# Patient Record
Sex: MALE | Race: WHITE | NOT HISPANIC OR LATINO | Employment: FULL TIME | URBAN - METROPOLITAN AREA
[De-identification: names, ages, dates, MRNs, and addresses within clinical notes are randomized per-mention and may not be internally consistent; named-entity substitution may affect disease eponyms.]

---

## 2019-02-01 ENCOUNTER — TRANSCRIBE ORDERS (OUTPATIENT)
Dept: ADMINISTRATIVE | Facility: HOSPITAL | Age: 54
End: 2019-02-01

## 2019-02-01 DIAGNOSIS — L72.0 EPIDERMOID CYST: Primary | ICD-10-CM

## 2019-02-08 ENCOUNTER — HOSPITAL ENCOUNTER (OUTPATIENT)
Dept: ULTRASOUND IMAGING | Facility: HOSPITAL | Age: 54
Discharge: HOME | End: 2019-02-08

## 2019-02-08 ENCOUNTER — HOSPITAL ENCOUNTER (OUTPATIENT)
Dept: ULTRASOUND IMAGING | Facility: HOSPITAL | Age: 54
Discharge: HOME | End: 2019-02-08
Attending: FAMILY MEDICINE | Admitting: FAMILY MEDICINE

## 2019-02-08 DIAGNOSIS — L72.0 EPIDERMOID CYST: ICD-10-CM

## 2019-02-08 PROCEDURE — 93976 VASCULAR STUDY: CPT

## 2019-02-08 PROCEDURE — 76870 US EXAM SCROTUM: CPT

## 2019-08-09 ENCOUNTER — TELEPHONE (OUTPATIENT)
Dept: GASTROENTEROLOGY | Facility: CLINIC | Age: 54
End: 2019-08-09

## 2019-08-09 NOTE — TELEPHONE ENCOUNTER
PATIENT CALLING TO CHECK ON HIS PAPERS HE MAILED COUPLE WEEKS AGO.  EXPLAINED UNABLE TO LOCATE FAST TRACK OR PHONE MESSAGE.  DID FAST TRACK OVER THE PHONE.    FAST TRACK (IN MEDIA) - LAST COLON 08/2011 - 5 YEAR RECALL - PERSONAL HISTORY OF POLYPS - FAMILY HX POLYPS, MOTHER AND FAMILY HX CRC GRAND FATHER (MOTHER SIDE) - SCHEDULE AT [place].    [facility] ARRIVE 1:45PM.  WILL MAIL INSTRUCTIONS.

## 2019-08-12 ENCOUNTER — PREP FOR SURGERY (OUTPATIENT)
Dept: OTHER | Facility: HOSPITAL | Age: 54
End: 2019-08-12

## 2019-08-12 DIAGNOSIS — Z86.010 PERSONAL HISTORY OF COLONIC POLYPS: Primary | ICD-10-CM

## 2019-08-13 PROBLEM — Z86.010 PERSONAL HISTORY OF COLONIC POLYPS: Status: ACTIVE | Noted: 2019-08-13

## 2019-08-13 PROBLEM — Z86.0100 PERSONAL HISTORY OF COLONIC POLYPS: Status: ACTIVE | Noted: 2019-08-13

## 2019-08-30 ENCOUNTER — OFFICE VISIT (OUTPATIENT)
Dept: ORTHOPEDIC SURGERY | Facility: CLINIC | Age: 54
End: 2019-08-30

## 2019-08-30 VITALS
SYSTOLIC BLOOD PRESSURE: 131 MMHG | WEIGHT: 205 LBS | HEART RATE: 60 BPM | DIASTOLIC BLOOD PRESSURE: 84 MMHG | HEIGHT: 73 IN | BODY MASS INDEX: 27.17 KG/M2

## 2019-08-30 DIAGNOSIS — R52 PAIN: Primary | ICD-10-CM

## 2019-08-30 DIAGNOSIS — M75.41 SUBACROMIAL IMPINGEMENT OF RIGHT SHOULDER: ICD-10-CM

## 2019-08-30 DIAGNOSIS — M75.51 SUBACROMIAL BURSITIS OF RIGHT SHOULDER JOINT: ICD-10-CM

## 2019-08-30 PROCEDURE — 73030 X-RAY EXAM OF SHOULDER: CPT | Performed by: NURSE PRACTITIONER

## 2019-08-30 PROCEDURE — 20610 DRAIN/INJ JOINT/BURSA W/O US: CPT | Performed by: NURSE PRACTITIONER

## 2019-08-30 PROCEDURE — 99203 OFFICE O/P NEW LOW 30 MIN: CPT | Performed by: NURSE PRACTITIONER

## 2019-08-30 RX ORDER — ERGOCALCIFEROL 1.25 MG/1
CAPSULE ORAL
COMMUNITY
Start: 2019-05-28

## 2019-08-30 RX ORDER — LIDOCAINE HYDROCHLORIDE 10 MG/ML
4 INJECTION, SOLUTION EPIDURAL; INFILTRATION; INTRACAUDAL; PERINEURAL
Status: COMPLETED | OUTPATIENT
Start: 2019-08-30 | End: 2019-08-30

## 2019-08-30 RX ORDER — FAMOTIDINE 40 MG/1
TABLET, FILM COATED ORAL
COMMUNITY
Start: 2019-08-18

## 2019-08-30 RX ORDER — FENOFIBRATE 134 MG/1
CAPSULE ORAL
COMMUNITY
Start: 2019-08-28 | End: 2020-01-10

## 2019-08-30 RX ORDER — MELOXICAM 15 MG/1
15 TABLET ORAL DAILY
Qty: 30 TABLET | Refills: 0 | Status: SHIPPED | OUTPATIENT
Start: 2019-08-30 | End: 2020-01-10

## 2019-08-30 RX ORDER — TRIAMCINOLONE ACETONIDE 40 MG/ML
80 INJECTION, SUSPENSION INTRA-ARTICULAR; INTRAMUSCULAR
Status: COMPLETED | OUTPATIENT
Start: 2019-08-30 | End: 2019-08-30

## 2019-08-30 RX ADMIN — TRIAMCINOLONE ACETONIDE 80 MG: 40 INJECTION, SUSPENSION INTRA-ARTICULAR; INTRAMUSCULAR at 09:34

## 2019-08-30 RX ADMIN — LIDOCAINE HYDROCHLORIDE 4 ML: 10 INJECTION, SOLUTION EPIDURAL; INFILTRATION; INTRACAUDAL; PERINEURAL at 09:34

## 2019-08-30 NOTE — PROGRESS NOTES
Procedure   Large Joint Arthrocentesis: R subacromial bursa  Date/Time: 8/30/2019 9:34 AM  Consent given by: patient  Site marked: site marked  Timeout: Immediately prior to procedure a time out was called to verify the correct patient, procedure, equipment, support staff and site/side marked as required   Supporting Documentation  Indications: pain   Procedure Details  Location: shoulder - R subacromial bursa  Preparation: Patient was prepped and draped in the usual sterile fashion  Needle size: 22 G  Approach: lateral  Medications administered: 4 mL lidocaine PF 1% 1 %; 80 mg triamcinolone acetonide 40 MG/ML  Patient tolerance: patient tolerated the procedure well with no immediate complications

## 2019-08-30 NOTE — PROGRESS NOTES
Subjective:     Patient ID: Clifford Zheng is a 54 y.o. male.    Chief Complaint:  Right shoulder injury  History of Present Illness  Clifford Zheng clinic with several week history of pain at the right shoulder.  Injury occurred 6/24/2019 after he was at home fell down steps.  He had abdominal released with the left upper extremity and his cell phone in the hand of the right upper extremity when he fell down approximately 5 steps walking outside and is unsure how he landed or hit the right shoulder.  He has been experiencing pain ever since.  He is been taking ibuprofen which does help relieve some discomfort however continues to experience maximal tenderness present at the lateral aspect of the shoulder.  He is a side sleeper and is having significant difficulty at night trying to rest secondary to the pain he is experiencing when he attempts to lay on the right side.  He does have to get off the right side secondary to pain.  Rates discomfort at worst and 7 to an 8 out of a 10 aching throbbing in nature.  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  With the arms out straight and he is gripping he is experiencing pain at the lateral aspect of the shoulder.  Denies pain radiating into the neck.  Denies presence of numbness or tingling radiating down the right upper extremity.  Denies previous x-ray, MRI, CT of the shoulder in the past.  He has had pain at the left shoulder which was work related however this is not.  He is right-hand dominant.  Denies other concerns that he has at this time.       Social History     Occupational History   • Not on file   Tobacco Use   • Smoking status: Never Smoker   Substance and Sexual Activity   • Alcohol use: No     Frequency: Never   • Drug use: Not on file   • Sexual activity: Not on file      Past Medical History:   Diagnosis Date   • Sleep apnea      No past surgical history  "on file.    Family History   Problem Relation Age of Onset   • Heart disease Mother    • Lung disease Maternal Grandmother    • Cancer Maternal Grandfather          Review of Systems   Constitutional: Negative for chills, diaphoresis, fever and unexpected weight change.   HENT: Negative for hearing loss, nosebleeds, sore throat and tinnitus.    Eyes: Negative for pain and visual disturbance.   Respiratory: Negative for cough, shortness of breath and wheezing.    Cardiovascular: Negative for chest pain and palpitations.   Gastrointestinal: Negative for abdominal pain, diarrhea, nausea and vomiting.   Endocrine: Negative for cold intolerance, heat intolerance and polydipsia.   Genitourinary: Negative for difficulty urinating, dysuria and hematuria.   Musculoskeletal: Positive for arthralgias.   Skin: Negative for rash and wound.   Allergic/Immunologic: Negative for environmental allergies.   Neurological: Negative for dizziness, syncope and numbness.   Hematological: Does not bruise/bleed easily.   Psychiatric/Behavioral: Negative for dysphoric mood and sleep disturbance. The patient is not nervous/anxious.    All other systems reviewed and are negative.          Objective:  Physical Exam    Vital signs reviewed.   General: No acute distress.  Eyes: conjunctiva clear; pupils equally round and reactive  ENT: external ears and nose atraumatic; oropharynx clear  CV: no peripheral edema  Resp: normal respiratory effort  Skin: no rashes or wounds; normal turgor  Psych: mood and affect appropriate; recent and remote memory intact    Vitals:    08/30/19 0833   BP: 131/84   Pulse: 60   Weight: 93 kg (205 lb)   Height: 185.4 cm (73\")         08/30/19  0833   Weight: 93 kg (205 lb)     Body mass index is 27.05 kg/m².      Right Shoulder Exam     Tenderness   The patient is experiencing tenderness in the acromion.    Range of Motion   External rotation: 60   Forward flexion: 180   Internal rotation 0 degrees: T10     Muscle " Strength   Internal rotation: 5/5   External rotation: 5/5   Supraspinatus: 5/5   Subscapularis: 5/5   Biceps: 5/5     Tests   Kendrick test: positive  Cross arm: negative  Impingement: positive  Drop arm: negative  Sulcus: absent    Other   Erythema: absent  Scars: absent  Sensation: normal  Pulse: present    Comments:  Negative empty can  negative Dane's  negative Speed's  negative bear hug exam  Negative lift-off               Imaging:  Right Shoulder X-Ray  Indication: Pain  AP Internal and External Rotation views    Findings:  No fracture  No bony lesion  Normal soft tissues  AC joint arthropathy     No prior studies were available for comparison.    Assessment:        1. Pain    2. Subacromial bursitis of right shoulder joint    3. Subacromial impingement of right shoulder           Plan:  1. Discussed plan of care with patient. Wishes to proceed with corticosteroid injection Subacromial bursa, lateral approach.  Discussed risks of injection including but not limited to tendon tear however wishes to proceed with injection.  2.  We will start him on meloxicam 15 mg 1 tablet once daily.  We will plan to see him back in clinic in approximately 4 weeks to reevaluate.  Patient verbalized understanding of all information agrees with plan of care.  He was also provided with home strengthening exercises for him to complete the right upper extremity.  Denies other concerns that he has at this time.  Orders:  Orders Placed This Encounter   Procedures   • XR Shoulder 2+ View Right       I ordered and reviewed the LEANNE today.     Dictated utilizing Dragon dictation

## 2019-10-24 ENCOUNTER — ANESTHESIA EVENT (OUTPATIENT)
Dept: PERIOP | Facility: HOSPITAL | Age: 54
End: 2019-10-24

## 2019-10-25 ENCOUNTER — HOSPITAL ENCOUNTER (OUTPATIENT)
Facility: HOSPITAL | Age: 54
Setting detail: HOSPITAL OUTPATIENT SURGERY
Discharge: HOME | End: 2019-10-25
Attending: INTERNAL MEDICINE | Admitting: INTERNAL MEDICINE

## 2019-10-25 ENCOUNTER — ANESTHESIA (OUTPATIENT)
Dept: PERIOP | Facility: HOSPITAL | Age: 54
End: 2019-10-25

## 2019-10-25 VITALS
WEIGHT: 199 LBS | OXYGEN SATURATION: 97 % | DIASTOLIC BLOOD PRESSURE: 82 MMHG | RESPIRATION RATE: 12 BRPM | TEMPERATURE: 98.2 F | HEIGHT: 73 IN | HEART RATE: 70 BPM | SYSTOLIC BLOOD PRESSURE: 113 MMHG | BODY MASS INDEX: 26.37 KG/M2

## 2019-10-25 DIAGNOSIS — Z86.010 PERSONAL HISTORY OF COLONIC POLYPS: ICD-10-CM

## 2019-10-25 PROCEDURE — 45380 COLONOSCOPY AND BIOPSY: CPT | Performed by: INTERNAL MEDICINE

## 2019-10-25 PROCEDURE — 88305 TISSUE EXAM BY PATHOLOGIST: CPT | Performed by: INTERNAL MEDICINE

## 2019-10-25 PROCEDURE — 25010000002 PROPOFOL 10 MG/ML EMULSION: Performed by: NURSE ANESTHETIST, CERTIFIED REGISTERED

## 2019-10-25 RX ORDER — ONDANSETRON 2 MG/ML
4 INJECTION INTRAMUSCULAR; INTRAVENOUS ONCE AS NEEDED
Status: CANCELLED | OUTPATIENT
Start: 2019-10-25

## 2019-10-25 RX ORDER — PROPOFOL 10 MG/ML
VIAL (ML) INTRAVENOUS AS NEEDED
Status: DISCONTINUED | OUTPATIENT
Start: 2019-10-25 | End: 2019-10-25 | Stop reason: SURG

## 2019-10-25 RX ORDER — SODIUM CHLORIDE 0.9 % (FLUSH) 0.9 %
1-10 SYRINGE (ML) INJECTION AS NEEDED
Status: DISCONTINUED | OUTPATIENT
Start: 2019-10-25 | End: 2019-10-25 | Stop reason: HOSPADM

## 2019-10-25 RX ORDER — SODIUM CHLORIDE, SODIUM LACTATE, POTASSIUM CHLORIDE, CALCIUM CHLORIDE 600; 310; 30; 20 MG/100ML; MG/100ML; MG/100ML; MG/100ML
100 INJECTION, SOLUTION INTRAVENOUS CONTINUOUS
Status: CANCELLED | OUTPATIENT
Start: 2019-10-25

## 2019-10-25 RX ORDER — SODIUM CHLORIDE 0.9 % (FLUSH) 0.9 %
3 SYRINGE (ML) INJECTION EVERY 12 HOURS SCHEDULED
Status: DISCONTINUED | OUTPATIENT
Start: 2019-10-25 | End: 2019-10-25 | Stop reason: HOSPADM

## 2019-10-25 RX ORDER — SODIUM CHLORIDE, SODIUM LACTATE, POTASSIUM CHLORIDE, CALCIUM CHLORIDE 600; 310; 30; 20 MG/100ML; MG/100ML; MG/100ML; MG/100ML
9 INJECTION, SOLUTION INTRAVENOUS CONTINUOUS
Status: DISCONTINUED | OUTPATIENT
Start: 2019-10-25 | End: 2019-10-25 | Stop reason: HOSPADM

## 2019-10-25 RX ORDER — LIDOCAINE HYDROCHLORIDE 20 MG/ML
INJECTION, SOLUTION INFILTRATION; PERINEURAL AS NEEDED
Status: DISCONTINUED | OUTPATIENT
Start: 2019-10-25 | End: 2019-10-25 | Stop reason: SURG

## 2019-10-25 RX ORDER — LIDOCAINE HYDROCHLORIDE 10 MG/ML
0.5 INJECTION, SOLUTION EPIDURAL; INFILTRATION; INTRACAUDAL; PERINEURAL ONCE AS NEEDED
Status: COMPLETED | OUTPATIENT
Start: 2019-10-25 | End: 2019-10-25

## 2019-10-25 RX ORDER — SODIUM CHLORIDE 9 MG/ML
40 INJECTION, SOLUTION INTRAVENOUS AS NEEDED
Status: DISCONTINUED | OUTPATIENT
Start: 2019-10-25 | End: 2019-10-25 | Stop reason: HOSPADM

## 2019-10-25 RX ADMIN — LIDOCAINE HYDROCHLORIDE 100 MG: 20 INJECTION, SOLUTION INFILTRATION; PERINEURAL at 14:06

## 2019-10-25 RX ADMIN — PROPOFOL 50 MG: 10 INJECTION, EMULSION INTRAVENOUS at 14:17

## 2019-10-25 RX ADMIN — SODIUM CHLORIDE, POTASSIUM CHLORIDE, SODIUM LACTATE AND CALCIUM CHLORIDE 9 ML/HR: 600; 310; 30; 20 INJECTION, SOLUTION INTRAVENOUS at 14:00

## 2019-10-25 RX ADMIN — PROPOFOL 100 MG: 10 INJECTION, EMULSION INTRAVENOUS at 14:09

## 2019-10-25 RX ADMIN — LIDOCAINE HYDROCHLORIDE 0.1 ML: 10 INJECTION, SOLUTION EPIDURAL; INFILTRATION; INTRACAUDAL; PERINEURAL at 14:01

## 2019-10-25 RX ADMIN — PROPOFOL 50 MG: 10 INJECTION, EMULSION INTRAVENOUS at 14:23

## 2019-10-25 RX ADMIN — PROPOFOL 50 MG: 10 INJECTION, EMULSION INTRAVENOUS at 14:12

## 2019-10-25 NOTE — ANESTHESIA PREPROCEDURE EVALUATION
Anesthesia Evaluation     Patient summary reviewed and Nursing notes reviewed   no history of anesthetic complications:  NPO Solid Status: > 8 hours  NPO Liquid Status: > 8 hours           Airway   Mallampati: II  TM distance: >3 FB  Neck ROM: full  No difficulty expected  Dental - normal exam     Pulmonary - normal exam    breath sounds clear to auscultation  (+) sleep apnea on CPAP,   Cardiovascular - negative cardio ROS and normal exam    Rhythm: regular  Rate: normal        Neuro/Psych- negative ROS  GI/Hepatic/Renal/Endo    (+)  GERD well controlled,      Musculoskeletal     (+) back pain, neck pain,   Abdominal  - normal exam   Substance History - negative use     OB/GYN          Other   (+) arthritis                     Anesthesia Plan    ASA 2     MAC     intravenous induction   Anesthetic plan, all risks, benefits, and alternatives have been provided, discussed and informed consent has been obtained with: patient.  Use of blood products discussed with patient  Consented to blood products.

## 2019-10-25 NOTE — ANESTHESIA POSTPROCEDURE EVALUATION
Patient: Clifford Zheng    Procedure Summary     Date:  10/25/19 Room / Location:  [facility] ENDOSCOPY 1 /  [facility]    Anesthesia Start:  1404 Anesthesia Stop:  1429    Procedure:  COLONOSCOPY (N/A ) Diagnosis:       Personal history of colonic polyps      Colon polyp      Diverticulosis large intestine w/o perforation or abscess w/o bleeding      (Personal history of colonic polyps [Z86.010])    Surgeon:  Jimmy Gonzalez MD Provider:  Megan Hernandez CRNA    Anesthesia Type:  MAC ASA Status:  2          Anesthesia Type: MAC  Last vitals  BP   113/82 (10/25/19 1501)   Temp   98.2 °F (36.8 °C) (10/25/19 1342)   Pulse   70 (10/25/19 1501)   Resp   12 (10/25/19 1445)     SpO2   97 % (10/25/19 1501)     Post Anesthesia Care and Evaluation    Patient location during evaluation: bedside  Patient participation: complete - patient participated  Level of consciousness: awake and alert  Pain score: 0  Pain management: adequate  Airway patency: patent  Anesthetic complications: No anesthetic complications    Cardiovascular status: acceptable  Respiratory status: acceptable  Hydration status: acceptable

## 2019-10-28 LAB
CYTO UR: NORMAL
LAB AP CASE REPORT: NORMAL
PATH REPORT.FINAL DX SPEC: NORMAL
PATH REPORT.GROSS SPEC: NORMAL

## 2019-12-20 ENCOUNTER — TRANSCRIBE ORDERS (OUTPATIENT)
Dept: ADMINISTRATIVE | Facility: HOSPITAL | Age: 54
End: 2019-12-20

## 2019-12-20 DIAGNOSIS — N18.2 KIDNEY DISEASE, CHRONIC, STAGE II (MILD, EGFR 60+ ML/MIN): Primary | ICD-10-CM

## 2019-12-23 ENCOUNTER — HOSPITAL ENCOUNTER (OUTPATIENT)
Dept: ULTRASOUND IMAGING | Facility: HOSPITAL | Age: 54
Discharge: HOME | End: 2019-12-23
Admitting: FAMILY MEDICINE

## 2019-12-23 DIAGNOSIS — N18.2 KIDNEY DISEASE, CHRONIC, STAGE II (MILD, EGFR 60+ ML/MIN): ICD-10-CM

## 2019-12-23 PROCEDURE — 76775 US EXAM ABDO BACK WALL LIM: CPT

## 2020-01-10 ENCOUNTER — OFFICE VISIT (OUTPATIENT)
Dept: ORTHOPEDIC SURGERY | Facility: CLINIC | Age: 55
End: 2020-01-10

## 2020-01-10 DIAGNOSIS — M75.41 SUBACROMIAL IMPINGEMENT OF RIGHT SHOULDER: Primary | ICD-10-CM

## 2020-01-10 DIAGNOSIS — M67.911 TENDINOPATHY OF RIGHT ROTATOR CUFF: ICD-10-CM

## 2020-01-10 PROCEDURE — 99213 OFFICE O/P EST LOW 20 MIN: CPT | Performed by: NURSE PRACTITIONER

## 2020-01-10 NOTE — PROGRESS NOTES
Subjective:     Patient ID: Clifford Zheng is a 54 y.o. male.    Chief Complaint:  Follow-up right shoulder impingement syndrome, subacromial bursitis  History of Present Illness  Clifford Zheng returns to clinic today for evaluation of right shoulder. Was last seen in clinic in August 2019, received corticosteroid injection subacromial bursa, lateral approach without any symptom relief. Continues to experience pain lateral acromion, anterior and posterior aspect of shoulder. Increased pain with reaching out to side, reaching behind back, pain present when sleeping at night on lateral aspect of shoulder. Positive for decreased strength right upper extremity. He has been preoccupied with other acute issues therefore has not yet had the opportunity to return to clinic for follow-up however received no symptom relief with treatment. He was unable to tolerate oral NSAID secondary to cyst on kidneys, all NSAID medications were discontinued. Denies presence of numbness or tingling radiating down right upper extremity. He has had previous MRI left shoulder which did not require surgical intervention however denies previous MRI right shoulder. He has tried physical therapy in past again with left shoulder only which exacerbated symptoms but no formal physical therapy right shoulder. Pain is not radiating into neck. Denies all other concerns present at this time.      Social History     Occupational History   • Not on file   Tobacco Use   • Smoking status: Never Smoker   • Smokeless tobacco: Never Used   Substance and Sexual Activity   • Alcohol use: No     Frequency: Never   • Drug use: Not on file   • Sexual activity: Not on file      Past Medical History:   Diagnosis Date   • Colon polyp    • GERD (gastroesophageal reflux disease)    • Shoulder pain     right   • Sleep apnea      Past Surgical History:   Procedure Laterality Date   • COLONOSCOPY     • COLONOSCOPY N/A 10/25/2019    Procedure: COLONOSCOPY;  Surgeon: Lisa  Jimmy Banks MD;  Location: [facility];  Service: Gastroenterology       Family History   Problem Relation Age of Onset   • Heart disease Mother    • Lung disease Maternal Grandmother    • Cancer Maternal Grandfather          Review of Systems   Constitutional: Negative for chills, diaphoresis, fever and unexpected weight change.   HENT: Negative for hearing loss, nosebleeds, sore throat and tinnitus.    Eyes: Negative for pain and visual disturbance.   Respiratory: Negative for cough, shortness of breath and wheezing.    Cardiovascular: Negative for chest pain and palpitations.   Gastrointestinal: Negative for abdominal pain, diarrhea, nausea and vomiting.   Endocrine: Negative for cold intolerance, heat intolerance and polydipsia.   Genitourinary: Negative for difficulty urinating, dysuria and hematuria.   Musculoskeletal: Positive for arthralgias. Negative for joint swelling and myalgias.   Skin: Negative for rash and wound.   Allergic/Immunologic: Negative for environmental allergies.   Neurological: Negative for dizziness, syncope and numbness.   Hematological: Does not bruise/bleed easily.   Psychiatric/Behavioral: Negative for dysphoric mood and sleep disturbance. The patient is not nervous/anxious.            Objective:  Physical Exam  General: No acute distress.  Eyes: conjunctiva clear; pupils equally round and reactive  ENT: external ears and nose atraumatic; oropharynx clear  CV: no peripheral edema  Resp: normal respiratory effort  Skin: no rashes or wounds; normal turgor  Psych: mood and affect appropriate; recent and remote memory intact    There were no vitals filed for this visit.  There were no vitals filed for this visit.  There is no height or weight on file to calculate BMI.      Right Shoulder Exam     Tenderness   The patient is experiencing tenderness in the acromion.    Range of Motion   External rotation: 60   Forward flexion: 180   Internal rotation 0 degrees: T12     Muscle Strength    Internal rotation: 4/5   External rotation: 4/5   Supraspinatus: 4/5   Subscapularis: 4/5   Biceps: 4/5     Tests   Kendrick test: negative  Cross arm: negative  Impingement: positive  Drop arm: negative  Sulcus: absent    Other   Erythema: absent  Sensation: normal  Pulse: present    Comments:  Mildly positive empty can  negative Ross's  negative Speed's  negative bear hug exam            Assessment:        1. Subacromial impingement of right shoulder    2. Tendinopathy of right rotator cuff           Plan:  1. Discussed treatment options at length with patient at today's visit. Will proceed with MRI to evaluate rotator cuff tendon tear. Will plan to see him back in clinic after completion of testing to discuss results and further plan of care. Patient verbalized understanding of all information and agrees with plan of care. Denies all other concerns present at this time.     Orders:  Orders Placed This Encounter   Procedures   • MRI Shoulder Right Without Contrast       Medications:  No orders of the defined types were placed in this encounter.      Followup:  No follow-ups on file.    Clifford was seen today for follow-up.    Diagnoses and all orders for this visit:    Subacromial impingement of right shoulder  -     MRI Shoulder Right Without Contrast; Future    Tendinopathy of right rotator cuff  -     MRI Shoulder Right Without Contrast; Future          Dictated utilizing Dragon dictation

## 2020-01-24 ENCOUNTER — HOSPITAL ENCOUNTER (OUTPATIENT)
Dept: MRI IMAGING | Facility: HOSPITAL | Age: 55
Discharge: HOME | End: 2020-01-24
Admitting: NURSE PRACTITIONER

## 2020-01-24 DIAGNOSIS — M75.41 SUBACROMIAL IMPINGEMENT OF RIGHT SHOULDER: ICD-10-CM

## 2020-01-24 DIAGNOSIS — M67.911 TENDINOPATHY OF RIGHT ROTATOR CUFF: ICD-10-CM

## 2020-01-24 PROCEDURE — 73221 MRI JOINT UPR EXTREM W/O DYE: CPT

## 2020-01-28 ENCOUNTER — TELEPHONE (OUTPATIENT)
Dept: ORTHOPEDIC SURGERY | Facility: CLINIC | Age: 55
End: 2020-01-28

## 2020-01-28 NOTE — TELEPHONE ENCOUNTER
Called patient to discuss MRI results Right shoulder:  INDICATION:    Order states subacromial impingement, tendinopathy, shoulder pain. Rotator cuff tear/impingement suspected. Technologist reports right shoulder pain. Painful to reach out. Fell down steps June 2019. Cortizone injection August 2019. No shoulder surgery.     TECHNIQUE:   MRI of the Right Shoulder without contrast.     COMPARISON:    None available.     FINDINGS:  Mild/moderate AC joint arthrosis with capsuloligamentous thickening and osteophyte formation is noted. Small subarticular cysts and articular irregularity with marrow edema are noted of the clavicle. There is minimal articular irregularity of the  acromion. Is mild inflammatory component to be related to sequela of a low-grade AC joint injury superimposed on arthritic change. Mild supraspinatus effacement is present. Coracoacromial and coracoclavicular ligaments are intact.     There is minimal insertional infraspinatus tendinosis without a tear. Supraspinatus and teres minor tendons are intact. Longitudinal high-grade tendinosis and/or interstitial longitudinal tear of the cranial third of the subscapularis measures 12 mm  transverse by CC and is about 50% in tendon thickness but largely interstitial in location. The rotator cuff muscles are normal.     Subacromial-subdeltoid and subcoracoid bursae are normal.     The biceps anchor, biceps tendon, and labrum are within normal limits.     There is no marrow lesion, fracture, or loose body. Glenohumeral joint shows no effusion, chondral/osteochondral lesion, or definite capsular inflammation.     IMPRESSION:  1. Mild/moderate AC joint arthrosis with an inflammatory component and mild supraspinatus effacement. Sequela of a low-grade AC joint injury superimposed on arthritic changes possible.  2. Minimal insertional infraspinatus tendinosis without a tear.  3. Longitudinal high-grade tendinosis and/or interstitial tear cranial third  subscapularis tendon detailed above.  4. No full-thickness cuff tear.  5. Biceps, labrum, glenohumeral joint are within normal limits.     Signer Name: Angella Mathias MD   Signed: 1/24/2020 2:04 PM   Workstation Name: [id number]    [facility]    Plan:  1.  I do recommend physical therapy for at least the next 6 weeks.  Unable to tolerate oral NSAID secondary to cyst on kidneys. May benefit from corticosteroid injection AC joint.

## 2020-01-31 ENCOUNTER — OFFICE VISIT (OUTPATIENT)
Dept: ORTHOPEDIC SURGERY | Facility: CLINIC | Age: 55
End: 2020-01-31

## 2020-01-31 VITALS — BODY MASS INDEX: 27.17 KG/M2 | WEIGHT: 205 LBS | HEIGHT: 73 IN

## 2020-01-31 DIAGNOSIS — M19.019 AC JOINT ARTHROPATHY: ICD-10-CM

## 2020-01-31 DIAGNOSIS — M67.911 TENDINOPATHY OF ROTATOR CUFF, RIGHT: Primary | ICD-10-CM

## 2020-01-31 PROCEDURE — 20605 DRAIN/INJ JOINT/BURSA W/O US: CPT | Performed by: NURSE PRACTITIONER

## 2020-01-31 PROCEDURE — 99213 OFFICE O/P EST LOW 20 MIN: CPT | Performed by: NURSE PRACTITIONER

## 2020-01-31 RX ORDER — ICOSAPENT ETHYL 1000 MG/1
CAPSULE ORAL
COMMUNITY
Start: 2019-12-08

## 2020-01-31 RX ADMIN — LIDOCAINE HYDROCHLORIDE 2 ML: 10 INJECTION, SOLUTION INFILTRATION; PERINEURAL at 08:48

## 2020-01-31 RX ADMIN — TRIAMCINOLONE ACETONIDE 40 MG: 40 INJECTION, SUSPENSION INTRA-ARTICULAR; INTRAMUSCULAR at 08:48

## 2020-02-04 RX ORDER — LIDOCAINE HYDROCHLORIDE 10 MG/ML
2 INJECTION, SOLUTION INFILTRATION; PERINEURAL
Status: COMPLETED | OUTPATIENT
Start: 2020-01-31 | End: 2020-01-31

## 2020-02-04 RX ORDER — TRIAMCINOLONE ACETONIDE 40 MG/ML
40 INJECTION, SUSPENSION INTRA-ARTICULAR; INTRAMUSCULAR
Status: COMPLETED | OUTPATIENT
Start: 2020-01-31 | End: 2020-01-31

## 2020-02-14 ENCOUNTER — HOSPITAL ENCOUNTER (OUTPATIENT)
Dept: PHYSICAL THERAPY | Facility: HOSPITAL | Age: 55
Setting detail: THERAPIES SERIES
Discharge: HOME | End: 2020-02-14

## 2020-02-14 DIAGNOSIS — M67.911 TENDINOPATHY OF RIGHT ROTATOR CUFF: Primary | ICD-10-CM

## 2020-02-14 PROCEDURE — 97161 PT EVAL LOW COMPLEX 20 MIN: CPT

## 2020-02-14 NOTE — THERAPY EVALUATION
Outpatient Physical Therapy Ortho Initial Evaluation   Asuncion Pulliam     Patient Name: Clifford Zheng  : 1965  MRN: [id number]  Today's Date: 2020      Visit Date: 2020    Patient Active Problem List   Diagnosis   • Personal history of colonic polyps   • Subacromial impingement of right shoulder   • Subacromial bursitis of right shoulder joint   • Tendinopathy of right rotator cuff        Past Medical History:   Diagnosis Date   • Colon polyp    • GERD (gastroesophageal reflux disease)    • Shoulder pain     right   • Sleep apnea         Past Surgical History:   Procedure Laterality Date   • COLONOSCOPY     • COLONOSCOPY N/A 10/25/2019    Procedure: COLONOSCOPY;  Surgeon: Jimmy Gonzalez MD;  Location: [facility] OR;  Service: Gastroenterology       Visit Dx:     ICD-10-CM ICD-9-CM   1. Tendinopathy of right rotator cuff M67.911 727.9         Patient History     Row Name 20 1200             History    Chief Complaint  Difficulty with daily activities;Joint stiffness;Muscle tenderness;Muscle weakness;Pain  -AS      Type of Pain  Shoulder pain Right  -AS      Brief Description of Current Complaint  Patient has been experiencing right shoulder pain for several months. He has had injections that have helped temporarily. He also has completed an MRI that shows no full tear of RC. He does have a tear in his subscapularis and tendinitis of RC.   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  Chelsie Lechuga  -AS      How has patient tried to help current problem?  rest, pain meds  -AS      What clinical tests have you had for this problem?  MRI  -AS      Results of Clinical Tests  Partial right RC tear  -AS         Pain     Pain Location   Shoulder  -AS      Pain at Present  0  -AS      Pain at Best  0  -AS      Pain at Worst  2  -AS      Pain Frequency  Intermittent  -AS      Pain Description  Aching  -AS      What Performance Factors Make the Current Problem(s) WORSE?  use of RUE  -AS      What Performance Factors Make the Current Problem(s) BETTER?  rest  -AS         Daily Activities    Primary Language  English  -AS      How does patient learn best?  Listening;Reading  -AS      Teaching needs identified  Home Exercise Program;Management of Condition  -AS      Patient is concerned about/has problems with  Flexibility;Performing home management (household chores, shopping, care of dependents);Performing job responsibilities/community activities (work, school,;Performing sports, recreation, and play activities;Reaching over head;Repetitive movements of the hand, arm, shoulder  -AS      Does patient have problems with the following?  None  -AS      Barriers to learning  None  -AS      Pt Participated in POC and Goals  Yes  -AS         Safety    Are you being hurt, hit, or frightened by anyone at home or in your life?  No  -AS      Are you being neglected by a caregiver  No  -AS        User Key  (r) = Recorded By, (t) = Taken By, (c) = Cosigned By    Initials Name Provider Type    AS Gaurav Hammond, PT Physical Therapist          PT Ortho     Row Name 02/14/20 1200       Precautions and Contraindications    Precautions/Limitations  no known precautions/limitations  -AS       Posture/Observations    Posture- WNL  Posture is WNL  -AS       Shoulder Impingement/Rotator Cuff Special Tests    Kendrick-Juice Test (RC Lesion vs. Bursitis)  Right:;Positive  -AS    Neer Impingement Test (RC Lesion vs. Bursitis)  Right:;Positive  -AS       Shoulder Girdle Palpation    Supraspinatus Insertion  Right:;Tender  -AS    AC Joint  Right:;Tender  -AS    Long Head of Biceps  Right:;Tender  -AS    Subscapularis  Right:;Tender  -AS    Greater Tubercule   Right:;Tender  -AS       Right Upper Ext    Rt Shoulder Abduction AROM  160  -AS    Rt Shoulder Flexion AROM  153  -AS    Rt Shoulder External Rotation AROM  WNL  -AS    Rt Shoulder Internal Rotation AROM  60  -AS       MMT Right Upper Ext    Rt Shoulder Flexion MMT, Gross Movement  (4/5) good  -AS    Rt Shoulder ABduction MMT, Gross Movement  (4/5) good  -AS    Rt Shoulder Internal Rotation MMT, Gross Movement  (4/5) good  -AS    Rt Shoulder External Rotation MMT, Gross Movement  (4/5) good  -AS       Sensation    Sensation WNL?  WNL  -AS    Light Touch  No apparent deficits  -AS      User Key  (r) = Recorded By, (t) = Taken By, (c) = Cosigned By    Initials Name Provider Type    AS Gaurav Hammond, PT Physical Therapist                      Therapy Education  Given: HEP, Symptoms/condition management, Pain management  Program: New  How Provided: Verbal, Demonstration, Written  Provided to: Patient  Level of Understanding: Teach back education performed, Verbalized, Demonstrated     PT OP Goals     Row Name 02/14/20 1200          PT Short Term Goals    STG Date to Achieve  03/06/20  -AS     STG 1  Patient to demonstrate compliance with his initial HEP for flexibility, ROM, and strengthening.  -AS     STG 2  Patient to report right shoulder pain on VAS of 2-3/10 at worst with activity.  -AS     STG 3  Patient to demonstrate improved RUE strength to 4+/5 in all planes.  -AS        Long Term Goals    LTG Date to Achieve  03/27/20  -AS     LTG 1  Patient to demonstrate compliance with his advanced HEP for flexibility, ROM, and strengthening.  -AS     LTG 2  Patient to report right shoulder pain on VAS of 0-1/10 at worst with activity.  -AS     LTG 3  Patient to demonstrate improved RUE strength to 5/5 in all planes.  -AS     LTG 4  Patient to demonstrate improved right shoulder IR AROM to WNL.  -AS     LTG 5  Patient to report improved function and decreased pain on Quick DASH by >10-15 points.  -AS        Time  Calculation    PT Goal Re-Cert Due Date  03/13/20  -AS       User Key  (r) = Recorded By, (t) = Taken By, (c) = Cosigned By    Initials Name Provider Type    AS Gaurav Hammond, PT Physical Therapist          PT Assessment/Plan     Row Name 02/14/20 1200          PT Assessment    Functional Limitations  Limitations in community activities;Performance in self-care ADL;Performance in sport activities;Performance in work activities  -AS     Impairments  Muscle strength;Pain;Range of motion  -AS     Assessment Comments  Patient presents to outpatient PT with complaints of chronic right shoulder pain and discomfort. He reports difficulty with ADL's and work related activities. He has limited right shoulder ROM, limited right shoulder strength, and increased right shoulder pain with activity. Patient has limited function at this time secondary to the above.  -AS     Please refer to paper survey for additional self-reported information  Yes  -AS     Rehab Potential  Good  -AS     Patient/caregiver participated in establishment of treatment plan and goals  Yes  -AS     Patient would benefit from skilled therapy intervention  Yes  -AS        PT Plan    PT Frequency  1x/week  -AS     Predicted Duration of Therapy Intervention (Therapy Eval)  4-6 weeks  -AS     Planned CPT's?  PT RE-EVAL: 76554;PT THER PROC EA 15 MIN: 96555;PT THER ACT EA 15 MIN: 29586;PT MANUAL THERAPY EA 15 MIN: 88358;PT NEUROMUSC RE-EDUCATION EA 15 MIN: 83665;PT ELECTRICAL STIM UNATTEND: ;PT ULTRASOUND EA 15 MIN: 37635;PT HOT/COLD PACK WC NONMCARE: 88845  -AS       User Key  (r) = Recorded By, (t) = Taken By, (c) = Cosigned By    Initials Name Provider Type    AS Gaurav Hammond, PT Physical Therapist          Modalities     Row Name 02/14/20 1200             Ice    Ice Applied  Yes  -AS      Location  Right Shoulder - With Ionto  -AS      Rx Minutes  10 mins  -AS      Ice S/P Rx  Yes  -AS         Iontophoresis 37047    Milliamps  40  -AS       MA/Min  4  -AS      Dexamethasone used  Yes  -AS      Patch Type  Large  -AS        User Key  (r) = Recorded By, (t) = Taken By, (c) = Cosigned By    Initials Name Provider Type    AS Gaurav Hammond, PT Physical Therapist        OP Exercises     Row Name 02/14/20 1200             Subjective Pain    Able to rate subjective pain?  yes  -AS      Pre-Treatment Pain Level  0  -AS      Post-Treatment Pain Level  0  -AS         Exercise 1    Exercise Name 1  Manual IR PROM  -AS      Reps 1  10  -AS      Time 1  10 sec hold each  -AS         Exercise 2    Exercise Name 2  Sleeper Stretch  -AS      Reps 2  10  -AS      Time 2  10 sec hold each  -AS         Exercise 3    Exercise Name 3  S/L ER  -AS      Reps 3  25  -AS      Additional Comments  1#  -AS         Exercise 4    Exercise Name 4  Prone I, Y, T  -AS         Exercise 5    Exercise Name 5  Empty/Full Can  -AS      Reps 5  25 each  -AS      Additional Comments  1#  -AS         Exercise 6    Exercise Name 6  Rows  -AS         Exercise 7    Exercise Name 7  Extensions  -AS         Exercise 8    Exercise Name 8  IR  -AS      Reps 8  25  -AS      Additional Comments  Black  -AS         Exercise 9    Exercise Name 9  ER  -AS      Reps 9  25  -AS      Additional Comments  Black  -AS        User Key  (r) = Recorded By, (t) = Taken By, (c) = Cosigned By    Initials Name Provider Type    AS Gaurav Hammond, PT Physical Therapist                        Outcome Measure Options: Quick DASH  Quick DASH  Open a tight or new jar.: No Difficulty  Do heavy household chores (e.g., wash walls, wash floors): Mild Difficulty  Carry a shopping bag or briefcase: No Difficulty  Wash your back: Severe Difficulty  Use a knife to cut food: No Difficulty  Recreational activities in which you take some force or impact through your arm, should or hand (e.g. golf, hammering, tennis, etc.): Severe Difficulty  During the past week, to what extent has your arm, shoulder, or hand  problem interfered with your normal social activites with family, friends, neighbors or groups?: Quite a bit  During the past week, were you limited in your work or other regular daily activities as a result of your arm, shoulder or hand problem?: Moderately Limited  Arm, Shoulder, or hand pain: Moderate  Tingling (pins and needles) in your arm, shoulder, or hand: None  During the past week, how much difficulty have you had sleeping because of the pain in your arm, shoulder or hand?: Mild Difficulty  Number of Questions Answered: 11  Quick DASH Score: 34.09  Work Module (Optional)  Using your usual technique for your work?: Mild Difficulty  Doing your usual work because of arm, shoulder or hand pain?: Mild Difficulty  Doing your work as well as you would like?: Moderate Difficulty  Spending your usual amount of time doing your work?: Mild Difficulty  Work Module Score: 31.25         Time Calculation:     Start Time: 1110  Stop Time: 1206  Time Calculation (min): 56 min     Therapy Charges for Today     Code Description Service Date Service Provider Modifiers Qty    03059588637 HC PT EVAL LOW COMPLEXITY 4 2/14/2020 Gaurav Hammond, PT GP 1          PT G-Codes  Outcome Measure Options: Quick DASH  Quick DASH Score: 34.09         Gaurav Hammond, PT  2/14/2020

## 2020-02-21 ENCOUNTER — HOSPITAL ENCOUNTER (OUTPATIENT)
Dept: PHYSICAL THERAPY | Facility: HOSPITAL | Age: 55
Setting detail: THERAPIES SERIES
Discharge: HOME | End: 2020-02-21

## 2020-02-21 DIAGNOSIS — M67.911 TENDINOPATHY OF RIGHT ROTATOR CUFF: Primary | ICD-10-CM

## 2020-02-21 PROCEDURE — 97110 THERAPEUTIC EXERCISES: CPT

## 2020-02-21 NOTE — THERAPY TREATMENT NOTE
Outpatient Physical Therapy Ortho Treatment Note   Asuncion Pulliam     Patient Name: Clifford Zheng  : 1965  MRN: [id number]  Today's Date: 2020      Visit Date: 2020    Visit Dx:    ICD-10-CM ICD-9-CM   1. Tendinopathy of right rotator cuff M67.911 727.9       Patient Active Problem List   Diagnosis   • Personal history of colonic polyps   • Subacromial impingement of right shoulder   • Subacromial bursitis of right shoulder joint   • Tendinopathy of right rotator cuff        Past Medical History:   Diagnosis Date   • Colon polyp    • GERD (gastroesophageal reflux disease)    • Shoulder pain     right   • Sleep apnea         Past Surgical History:   Procedure Laterality Date   • COLONOSCOPY     • COLONOSCOPY N/A 10/25/2019    Procedure: COLONOSCOPY;  Surgeon: Jimmy Gonzalez MD;  Location: [facility];  Service: Gastroenterology                       PT Assessment/Plan     Row Name 20 1100          PT Assessment    Assessment Comments  Progressed patient with RUE strengthening exercises today without complaints of increased right shoulder pain or discomfort.  -AS        PT Plan    PT Plan Comments  Continue with current treatment plan.  -AS       User Key  (r) = Recorded By, (t) = Taken By, (c) = Cosigned By    Initials Name Provider Type    AS Gaurav Hammond, PT Physical Therapist          Modalities     Row Name 20 1100             Ice    Ice Applied  Yes  -AS      Location  Right Shoulder - With Ionto  -AS      Rx Minutes  10 mins  -AS      Ice S/P Rx  Yes  -AS         Iontophoresis 64578    Milliamps  40  -AS      MA/Min  4  -AS      Dexamethasone used  Yes  -AS      Patch Type  Large  -AS        User Key  (r) = Recorded By, (t) = Taken By, (c) = Cosigned By    Initials Name Provider Type    AS Gaurav Hammond, PT Physical Therapist        OP Exercises     Row Name 20 1100             Subjective Comments    Subjective Comments  Patient states his  "shoulder was a little sore after his first treatment session. 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 States his pain \"lasted for about 2 hours and then seddenly went away\".  -AS         Exercise 1    Exercise Name 1  Manual IR PROM  -AS      Reps 1  10  -AS      Time 1  10 sec hold each  -AS         Exercise 2    Exercise Name 2  Sleeper Stretch  -AS      Reps 2  10  -AS      Time 2  10 sec hold each  -AS         Exercise 3    Exercise Name 3  S/L ER  -AS      Reps 3  25  -AS      Time 3  1#  -AS         Exercise 4    Exercise Name 4  Prone I, Y, T  -AS      Reps 4  25 each  -AS         Exercise 5    Exercise Name 5  Empty/Full Can  -AS      Reps 5  25 each  -AS      Time 5  1#  -AS         Exercise 6    Exercise Name 6  Rows  -AS      Reps 6  25  -AS      Time 6  Black   -AS         Exercise 7    Exercise Name 7  Extensions  -AS      Reps 7  25  -AS      Time 7  Black  -AS         Exercise 8    Exercise Name 8  IR  -AS      Reps 8  25  -AS      Time 8  Black  -AS         Exercise 9    Exercise Name 9  ER  -AS      Reps 9  25  -AS      Time 9  Black  -AS        User Key  (r) = Recorded By, (t) = Taken By, (c) = Cosigned By    Initials Name Provider Type    AS Gaurav Hammond, PT Physical Therapist                                          Time Calculation:   Start Time: 1053  Stop Time: 1136  Time Calculation (min): 43 min  Therapy Charges for Today     Code Description Service Date Service Provider Modifiers Qty    70940263994  PT THER PROC EA 15 MIN 2/21/2020 Gaurav Hammond, PT GP 2                    Gaurav Hammond, PT  2/21/2020     "

## 2020-02-28 ENCOUNTER — HOSPITAL ENCOUNTER (OUTPATIENT)
Dept: PHYSICAL THERAPY | Facility: HOSPITAL | Age: 55
Setting detail: THERAPIES SERIES
Discharge: HOME | End: 2020-02-28

## 2020-02-28 DIAGNOSIS — M67.911 TENDINOPATHY OF RIGHT ROTATOR CUFF: Primary | ICD-10-CM

## 2020-02-28 PROCEDURE — 97110 THERAPEUTIC EXERCISES: CPT

## 2020-02-28 PROCEDURE — 97033 APP MDLTY 1+IONTPHRSIS EA 15: CPT

## 2020-02-28 NOTE — THERAPY TREATMENT NOTE
Outpatient Physical Therapy Ortho Treatment Note   Asuncion Pulliam     Patient Name: Clifford Zheng  : 1965  MRN: [id number]  Today's Date: 2020      Visit Date: 2020    Visit Dx:    ICD-10-CM ICD-9-CM   1. Tendinopathy of right rotator cuff M67.911 727.9       Patient Active Problem List   Diagnosis   • Personal history of colonic polyps   • Subacromial impingement of right shoulder   • Subacromial bursitis of right shoulder joint   • Tendinopathy of right rotator cuff        Past Medical History:   Diagnosis Date   • Colon polyp    • GERD (gastroesophageal reflux disease)    • Shoulder pain     right   • Sleep apnea         Past Surgical History:   Procedure Laterality Date   • COLONOSCOPY     • COLONOSCOPY N/A 10/25/2019    Procedure: COLONOSCOPY;  Surgeon: Jimmy Gonzalez MD;  Location: [facility];  Service: Gastroenterology                       PT Assessment/Plan     Row Name 20 1200          PT Assessment    Assessment Comments  Progressed patient with RUE strengthening exercises today without complaints of increased right shoulder pain or discomfort. He tolerated these progressions well today.  -AS        PT Plan    PT Plan Comments  Continue with current treatment plan.  -AS       User Key  (r) = Recorded By, (t) = Taken By, (c) = Cosigned By    Initials Name Provider Type    AS Gaurav Hammond, PT Physical Therapist          Modalities     Row Name 20 1200             Ice    Ice Applied  Yes  -AS      Location  Right Shoulder - With Ionto  -AS      Rx Minutes  10 mins  -AS      Ice S/P Rx  Yes  -AS         Iontophoresis 73119    Milliamps  40  -AS      MA/Min  4  -AS      Dexamethasone used  Yes  -AS      Patch Type  Large  -AS        User Key  (r) = Recorded By, (t) = Taken By, (c) = Cosigned By    Initials Name Provider Type    AS Gaurav Hammond, PT Physical Therapist        OP Exercises     Row Name 20 1200             Subjective Comments     Subjective Comments  Patient states his shoulder feels a little better but he does continue to have pain and discomfort with activity.  -AS         Exercise 1    Exercise Name 1  Manual IR PROM  -AS      Reps 1  10  -AS      Time 1  10 sec hold each  -AS         Exercise 2    Exercise Name 2  Sleeper Stretch  -AS      Reps 2  10  -AS      Time 2  10 sec hold each  -AS         Exercise 3    Exercise Name 3  S/L ER  -AS      Reps 3  25  -AS      Time 3  2#  -AS         Exercise 4    Exercise Name 4  Prone I, Y, T  -AS      Reps 4  30 each  -AS         Exercise 5    Exercise Name 5  Empty/Full Can  -AS      Reps 5  25 each  -AS      Time 5  2#  -AS         Exercise 6    Exercise Name 6  Rows  -AS      Reps 6  30  -AS      Time 6  Black   -AS         Exercise 7    Exercise Name 7  Extensions  -AS      Reps 7  30  -AS      Time 7  Black  -AS         Exercise 8    Exercise Name 8  IR  -AS      Reps 8  30  -AS      Time 8  Black  -AS         Exercise 9    Exercise Name 9  ER  -AS      Reps 9  30  -AS      Time 9  Black  -AS        User Key  (r) = Recorded By, (t) = Taken By, (c) = Cosigned By    Initials Name Provider Type    AS Gaurav Hammond, PT Physical Therapist                                          Time Calculation:   Start Time: 1131  Stop Time: 1213  Time Calculation (min): 42 min  Therapy Charges for Today     Code Description Service Date Service Provider Modifiers Qty    04646648702  PT THER PROC EA 15 MIN 2/28/2020 Gaurav Hammond, PT GP 1    [id number]  PT IONTOPHORESIS EA 15 MIN 2/28/2020 Gaurav Hammond, PT GP 1                    Gaurav Hammond, PT  2/28/2020

## 2020-03-06 ENCOUNTER — HOSPITAL ENCOUNTER (OUTPATIENT)
Dept: PHYSICAL THERAPY | Facility: HOSPITAL | Age: 55
Setting detail: THERAPIES SERIES
Discharge: HOME | End: 2020-03-06

## 2020-03-06 DIAGNOSIS — M67.911 TENDINOPATHY OF RIGHT ROTATOR CUFF: Primary | ICD-10-CM

## 2020-03-06 PROCEDURE — 97110 THERAPEUTIC EXERCISES: CPT

## 2020-03-06 PROCEDURE — 97033 APP MDLTY 1+IONTPHRSIS EA 15: CPT

## 2020-03-06 NOTE — THERAPY TREATMENT NOTE
Outpatient Physical Therapy Ortho Treatment Note   Asuncion Pulliam     Patient Name: Clifford Zheng  : 1965  MRN: [id number]  Today's Date: 3/6/2020      Visit Date: 2020    Visit Dx:    ICD-10-CM ICD-9-CM   1. Tendinopathy of right rotator cuff M67.911 727.9       Patient Active Problem List   Diagnosis   • Personal history of colonic polyps   • Subacromial impingement of right shoulder   • Subacromial bursitis of right shoulder joint   • Tendinopathy of right rotator cuff        Past Medical History:   Diagnosis Date   • Colon polyp    • GERD (gastroesophageal reflux disease)    • Shoulder pain     right   • Sleep apnea         Past Surgical History:   Procedure Laterality Date   • COLONOSCOPY     • COLONOSCOPY N/A 10/25/2019    Procedure: COLONOSCOPY;  Surgeon: Jimmy Gonzalez MD;  Location: [facility];  Service: Gastroenterology                       PT Assessment/Plan     Row Name 20 1130          PT Assessment    Assessment Comments  Pt presents with decreased point tenderness over bicep tendon and lateral to AC joint. Pt tolerated progression of ther ex well.   -KM        PT Plan    PT Plan Comments  Continue per POC  -KM       User Key  (r) = Recorded By, (t) = Taken By, (c) = Cosigned By    Initials Name Provider Type    Mercedes Mckenzie PTA Physical Therapy Assistant          Modalities     Row Name 20 1130             Ice    Ice Applied  Yes  -KM      Location  Right Shoulder - With Ionto  -KM      Rx Minutes  10 mins  -KM      Ice S/P Rx  Yes  -KM         Iontophoresis 03416    Milliamps  40  -KM      MA/Min  4  -KM      Dexamethasone used  Yes  -KM      Patch Type  Large  -KM        User Key  (r) = Recorded By, (t) = Taken By, (c) = Cosigned By    Initials Name Provider Type    Mercedes Mckenzie PTA Physical Therapy Assistant        OP Exercises     Row Name 20 1130             Subjective Comments    Subjective Comments  Pt states his shoulder feels pretty  good.   -KM         Exercise 1    Exercise Name 1  Manual IR PROM  -KM      Reps 1  10  -KM      Time 1  10 sec hold each  -KM         Exercise 2    Exercise Name 2  Sleeper Stretch  -KM      Reps 2  10  -KM      Time 2  10 sec hold each  -KM         Exercise 3    Exercise Name 3  S/L ER  -KM      Reps 3  30  -KM      Time 3  2#  -KM         Exercise 4    Exercise Name 4  Prone I, Y, T  -KM      Reps 4  30 each  -KM         Exercise 5    Exercise Name 5  Empty/Full Can  -KM      Reps 5  25 each  -KM      Time 5  2#  -KM         Exercise 6    Exercise Name 6  Rows  -KM      Reps 6  25  -KM      Time 6  Silver  -KM         Exercise 7    Exercise Name 7  Extensions  -KM      Reps 7  25  -KM      Time 7  Silver  -KM         Exercise 8    Exercise Name 8  IR  -KM      Reps 8  25  -KM      Time 8  Silver  -KM         Exercise 9    Exercise Name 9  ER  -KM      Reps 9  25  -KM      Time 9  Silver  -KM        User Key  (r) = Recorded By, (t) = Taken By, (c) = Cosigned By    Initials Name Provider Type    Mercedes Mckenzie PTA Physical Therapy Assistant                                          Time Calculation:   Start Time: 1130  Stop Time: 1215  Time Calculation (min): 45 min  Therapy Charges for Today     Code Description Service Date Service Provider Modifiers Qty    34529627685 HC PT THER PROC EA 15 MIN 3/6/2020 Mercedes Ch PTA GP 1    [id number] HC PT IONTOPHORESIS EA 15 MIN 3/6/2020 Mercedes Ch PTA GP 1                    Mercedes Ch PTA  3/6/2020

## 2020-03-20 ENCOUNTER — HOSPITAL ENCOUNTER (OUTPATIENT)
Dept: PHYSICAL THERAPY | Facility: HOSPITAL | Age: 55
Setting detail: THERAPIES SERIES
Discharge: HOME | End: 2020-03-20

## 2020-03-20 DIAGNOSIS — M67.911 TENDINOPATHY OF RIGHT ROTATOR CUFF: Primary | ICD-10-CM

## 2020-03-20 PROCEDURE — 97110 THERAPEUTIC EXERCISES: CPT

## 2020-03-20 NOTE — THERAPY TREATMENT NOTE
Outpatient Physical Therapy Ortho Treatment Note   Asuncion Pulliam     Patient Name: Clifford Zheng  : 1965  MRN: [id number]  Today's Date: 3/20/2020      Visit Date: 2020    Visit Dx:    ICD-10-CM ICD-9-CM   1. Tendinopathy of right rotator cuff M67.911 727.9       Patient Active Problem List   Diagnosis   • Personal history of colonic polyps   • Subacromial impingement of right shoulder   • Subacromial bursitis of right shoulder joint   • Tendinopathy of right rotator cuff        Past Medical History:   Diagnosis Date   • Colon polyp    • GERD (gastroesophageal reflux disease)    • Shoulder pain     right   • Sleep apnea         Past Surgical History:   Procedure Laterality Date   • COLONOSCOPY     • COLONOSCOPY N/A 10/25/2019    Procedure: COLONOSCOPY;  Surgeon: Jimmy Gonzalez MD;  Location: [facility];  Service: Gastroenterology       PT Ortho     Row Name 20 1100       Subjective Comments    Subjective Comments  Pt states his shoulder has felt really good this week even with his work activities - 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  yes  -    Pre-Treatment Pain Level  0  -    Post-Treatment Pain Level  0  -      User Key  (r) = Recorded By, (t) = Taken By, (c) = Cosigned By    Initials Name Provider Type    Mario Sánchez PTA Physical Therapy Assistant                      PT Assessment/Plan     Row Name 20 1143          PT Assessment    Assessment Comments  pt fatigued with exercise progression but demonstrates understanding of exercise progression; able to tolerate work activities without pain; no ionto today due to pt's lack of pain  -        PT Plan    PT Plan Comments  Pt to continue with HEP at this time  -       User Key  (r) = Recorded By, (t) = Taken By, (c) = Cosigned By    Initials Name Provider Type    Mario Sánchez PTA Physical Therapy Assistant           Modalities     Row Name 03/20/20 1100             Ice    Location  (R) shoulder - pt seated  -MH      Rx Minutes  10 mins  -MH      Ice S/P Rx  Yes  -MH        User Key  (r) = Recorded By, (t) = Taken By, (c) = Cosigned By    Initials Name Provider Type    Mario Sánchez PTA Physical Therapy Assistant        OP Exercises     Row Name 03/20/20 1100             Subjective Comments    Subjective Comments  Pt states his shoulder has felt really good this week even with his work activities - did not experience pain while driving at work like he was before  -MH         Subjective Pain    Able to rate subjective pain?  yes  -MH      Pre-Treatment Pain Level  0  -MH      Post-Treatment Pain Level  0  -MH         Exercise 1    Exercise Name 1  Manual IR PROM  -MH      Reps 1  10  -MH      Time 1  10 sec hold each  -MH         Exercise 2    Exercise Name 2  Sleeper Stretch  -MH      Reps 2  10  -MH      Time 2  10 sec hold each  -MH         Exercise 3    Exercise Name 3  S/L ER  -MH      Reps 3  40  -MH      Time 3  2#  -MH         Exercise 4    Exercise Name 4  Prone I, Y, T  -MH      Reps 4  30 each  -MH         Exercise 5    Exercise Name 5  Empty/Full Can  -MH      Reps 5  40 each  -MH      Time 5  2#  -MH         Exercise 6    Exercise Name 6  Rows  -MH      Reps 6  30  -MH      Time 6  Silver  -MH         Exercise 7    Exercise Name 7  Extensions  -MH      Reps 7  30  -MH      Time 7  Silver  -MH         Exercise 8    Exercise Name 8  IR  -MH      Reps 8  25  -MH      Time 8  Silver  -MH         Exercise 9    Exercise Name 9  ER  -MH      Reps 9  25  -MH      Time 9  Silver  -MH        User Key  (r) = Recorded By, (t) = Taken By, (c) = Cosigned By    Initials Name Provider Type    Mario Sánchez PTA Physical Therapy Assistant                           Therapy Education  Education Details: review of HEP and how to progress; silver theraband issued for home  Given: HEP  Program: Reinforced, Progressed  How  Provided: Verbal  Provided to: Patient  Level of Understanding: Teach back education performed, Verbalized, Demonstrated              Time Calculation:   Start Time: 1100  Stop Time: 1146  Time Calculation (min): 46 min  Therapy Charges for Today     Code Description Service Date Service Provider Modifiers Qty    59142400412 HC PT THER PROC EA 15 MIN 3/20/2020 Mario To, PTA GP 2                    Mario To, TAI  3/20/2020

## 2020-03-20 NOTE — THERAPY TREATMENT NOTE
Outpatient Physical Therapy Ortho Treatment Note   Asuncion Pulliam     Patient Name: Clifford Zheng  : 1965  MRN: [id number]  Today's Date: 3/20/2020      Visit Date: 2020    Visit Dx:    ICD-10-CM ICD-9-CM   1. Tendinopathy of right rotator cuff M67.911 727.9       Patient Active Problem List   Diagnosis   • Personal history of colonic polyps   • Subacromial impingement of right shoulder   • Subacromial bursitis of right shoulder joint   • Tendinopathy of right rotator cuff        Past Medical History:   Diagnosis Date   • Colon polyp    • GERD (gastroesophageal reflux disease)    • Shoulder pain     right   • Sleep apnea         Past Surgical History:   Procedure Laterality Date   • COLONOSCOPY     • COLONOSCOPY N/A 10/25/2019    Procedure: COLONOSCOPY;  Surgeon: Jimmy Gonzalez MD;  Location: [facility];  Service: Gastroenterology       PT Ortho     Row Name 20 1100       Subjective Comments    Subjective Comments  Pt states his shoulder has felt really good this week even with his work activities - 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  yes  -    Pre-Treatment Pain Level  0  -    Post-Treatment Pain Level  0  -      User Key  (r) = Recorded By, (t) = Taken By, (c) = Cosigned By    Initials Name Provider Type     Mario To, TAI Physical Therapy Assistant                      PT Assessment/Plan     Row Name 20 1143          PT Assessment    Assessment Comments  pt fatigued with exercise progression but demonstrates understanding of exercise progression; able to tolerate work activities without pain; no ionto today due to pt's lack of pain  -        PT Plan    PT Plan Comments  Pt to continue with HEP; therapy placed on hold at this time due to government shutdown caused by covid 19  -       User Key  (r) = Recorded By, (t) = Taken By, (c) = Cosigned By    Initials Name  Provider Type    Mario Sánchez PTA Physical Therapy Assistant          Modalities     Row Name 03/20/20 1100             Ice    Location  (R) shoulder - pt seated  -MH      Rx Minutes  10 mins  -MH      Ice S/P Rx  Yes  -MH        User Key  (r) = Recorded By, (t) = Taken By, (c) = Cosigned By    Initials Name Provider Type    Mario Sánchez PTA Physical Therapy Assistant        OP Exercises     Row Name 03/20/20 1100             Subjective Comments    Subjective Comments  Pt states his shoulder has felt really good this week even with his work activities - did not experience pain while driving at work like he was before  -MH         Subjective Pain    Able to rate subjective pain?  yes  -MH      Pre-Treatment Pain Level  0  -MH      Post-Treatment Pain Level  0  -MH         Exercise 1    Exercise Name 1  Manual IR PROM  -MH      Reps 1  10  -MH      Time 1  10 sec hold each  -MH         Exercise 2    Exercise Name 2  Sleeper Stretch  -MH      Reps 2  10  -MH      Time 2  10 sec hold each  -MH         Exercise 3    Exercise Name 3  S/L ER  -MH      Reps 3  40  -MH      Time 3  2#  -MH         Exercise 4    Exercise Name 4  Prone I, Y, T  -MH      Reps 4  30 each  -MH         Exercise 5    Exercise Name 5  Empty/Full Can  -MH      Reps 5  40 each  -MH      Time 5  2#  -MH         Exercise 6    Exercise Name 6  Rows  -MH      Reps 6  30  -MH      Time 6  Silver  -MH         Exercise 7    Exercise Name 7  Extensions  -MH      Reps 7  30  -MH      Time 7  Silver  -MH         Exercise 8    Exercise Name 8  IR  -MH      Reps 8  25  -MH      Time 8  Silver  -MH         Exercise 9    Exercise Name 9  ER  -MH      Reps 9  25  -MH      Time 9  Silver  -MH        User Key  (r) = Recorded By, (t) = Taken By, (c) = Cosigned By    Initials Name Provider Type    Mario Sánchez PTA Physical Therapy Assistant                           Therapy Education  Education Details: review of HEP and how to progress; silver  theraband issued for home  Given: HEP  Program: Reinforced, Progressed  How Provided: Verbal  Provided to: Patient  Level of Understanding: Teach back education performed, Verbalized, Demonstrated              Time Calculation:   Start Time: 1100  Stop Time: 1146  Time Calculation (min): 46 min  Therapy Charges for Today     Code Description Service Date Service Provider Modifiers Qty    62431218320 HC PT THER PROC EA 15 MIN 3/20/2020 Mario To, TAI GP 2                    Mario To PTA  3/20/2020

## 2020-03-31 ENCOUNTER — DOCUMENTATION (OUTPATIENT)
Dept: PHYSICAL THERAPY | Facility: HOSPITAL | Age: 55
End: 2020-03-31

## 2020-03-31 NOTE — THERAPY TREATMENT NOTE
Outpatient Physical Therapy Ortho Progress Note       Patient Name: Clifford Zheng  : 1965  MRN: [id number]  Today's Date: 3/31/2020      Visit Date: 2020    Visit Dx:  No diagnosis found.    Patient Active Problem List   Diagnosis   • Personal history of colonic polyps   • Subacromial impingement of right shoulder   • Subacromial bursitis of right shoulder joint   • Tendinopathy of right rotator cuff        Past Medical History:   Diagnosis Date   • Colon polyp    • GERD (gastroesophageal reflux disease)    • Shoulder pain     right   • Sleep apnea         Past Surgical History:   Procedure Laterality Date   • COLONOSCOPY     • COLONOSCOPY N/A 10/25/2019    Procedure: COLONOSCOPY;  Surgeon: Jimmy Gonzalez MD;  Location: [facility] [place];  Service: Gastroenterology           Phone messaged left for pt - encouraged to continue with his HEP and to call if he has any questions or concerns                                                      Time Calculation:                    Mario To, TAI  3/31/2020

## 2021-12-13 ENCOUNTER — TRANSCRIBE ORDERS (OUTPATIENT)
Dept: ADMINISTRATIVE | Facility: HOSPITAL | Age: 56
End: 2021-12-13

## 2021-12-13 ENCOUNTER — HOSPITAL ENCOUNTER (OUTPATIENT)
Dept: CT IMAGING | Facility: HOSPITAL | Age: 56
Discharge: HOME | End: 2021-12-13
Admitting: FAMILY MEDICINE

## 2021-12-13 DIAGNOSIS — R29.810 FACIAL WEAKNESS: Primary | ICD-10-CM

## 2021-12-13 PROCEDURE — 70450 CT HEAD/BRAIN W/O DYE: CPT

## 2022-03-23 ENCOUNTER — TRANSCRIBE ORDERS (OUTPATIENT)
Dept: ADMINISTRATIVE | Facility: HOSPITAL | Age: 57
End: 2022-03-23

## 2022-03-23 DIAGNOSIS — N50.9 DISORDER OF MALE GENITAL ORGANS, UNSPECIFIED: Primary | ICD-10-CM

## 2022-04-01 ENCOUNTER — HOSPITAL ENCOUNTER (OUTPATIENT)
Dept: ULTRASOUND IMAGING | Facility: HOSPITAL | Age: 57
Discharge: HOME | End: 2022-04-01
Admitting: UROLOGY

## 2022-04-01 DIAGNOSIS — N50.9 DISORDER OF MALE GENITAL ORGANS, UNSPECIFIED: ICD-10-CM

## 2022-04-01 PROCEDURE — 93976 VASCULAR STUDY: CPT

## 2022-04-01 PROCEDURE — 76870 US EXAM SCROTUM: CPT

## 2022-05-16 ENCOUNTER — TRANSCRIBE ORDERS (OUTPATIENT)
Dept: ULTRASOUND IMAGING | Facility: HOSPITAL | Age: 57
End: 2022-05-16

## 2022-05-16 DIAGNOSIS — N28.1 RENAL CYST: Primary | ICD-10-CM

## 2022-05-27 ENCOUNTER — HOSPITAL ENCOUNTER (OUTPATIENT)
Dept: ULTRASOUND IMAGING | Facility: HOSPITAL | Age: 57
Discharge: HOME | End: 2022-05-27
Admitting: FAMILY MEDICINE

## 2022-05-27 DIAGNOSIS — N28.1 RENAL CYST: ICD-10-CM

## 2022-05-27 PROCEDURE — 76775 US EXAM ABDO BACK WALL LIM: CPT

## 2023-05-05 ENCOUNTER — PRE-ADMISSION TESTING (OUTPATIENT)
Dept: PREADMISSION TESTING | Facility: HOSPITAL | Age: 58
End: 2023-05-05
Payer: COMMERCIAL

## 2023-05-05 VITALS
WEIGHT: 208.78 LBS | SYSTOLIC BLOOD PRESSURE: 152 MMHG | DIASTOLIC BLOOD PRESSURE: 86 MMHG | HEART RATE: 68 BPM | RESPIRATION RATE: 16 BRPM | HEIGHT: 73 IN | OXYGEN SATURATION: 98 % | BODY MASS INDEX: 27.67 KG/M2

## 2023-05-05 LAB
ANION GAP SERPL CALCULATED.3IONS-SCNC: 9.5 MMOL/L (ref 5–15)
BUN SERPL-MCNC: 23 MG/DL (ref 6–20)
BUN/CREAT SERPL: 17.2 (ref 7–25)
CALCIUM SPEC-SCNC: 9.8 MG/DL (ref 8.6–10.5)
CHLORIDE SERPL-SCNC: 107 MMOL/L (ref 98–107)
CO2 SERPL-SCNC: 23.5 MMOL/L (ref 22–29)
CREAT SERPL-MCNC: 1.34 MG/DL (ref 0.76–1.27)
DEPRECATED RDW RBC AUTO: 39.4 FL (ref 37–54)
EGFRCR SERPLBLD CKD-EPI 2021: 61.8 ML/MIN/1.73
ERYTHROCYTE [DISTWIDTH] IN BLOOD BY AUTOMATED COUNT: 11.5 % (ref 12.3–15.4)
GLUCOSE SERPL-MCNC: 109 MG/DL (ref 65–99)
HCT VFR BLD AUTO: 42.6 % (ref 37.5–51)
HGB BLD-MCNC: 14.4 G/DL (ref 13–17.7)
MCH RBC QN AUTO: 31.2 PG (ref 26.6–33)
MCHC RBC AUTO-ENTMCNC: 33.8 G/DL (ref 31.5–35.7)
MCV RBC AUTO: 92.2 FL (ref 79–97)
PLATELET # BLD AUTO: 169 10*3/MM3 (ref 140–450)
PMV BLD AUTO: 10.9 FL (ref 6–12)
POTASSIUM SERPL-SCNC: 4.3 MMOL/L (ref 3.5–5.2)
QT INTERVAL: 398 MS
RBC # BLD AUTO: 4.62 10*6/MM3 (ref 4.14–5.8)
SODIUM SERPL-SCNC: 140 MMOL/L (ref 136–145)
WBC NRBC COR # BLD: 4.74 10*3/MM3 (ref 3.4–10.8)

## 2023-05-05 PROCEDURE — 36415 COLL VENOUS BLD VENIPUNCTURE: CPT

## 2023-05-05 PROCEDURE — 93005 ELECTROCARDIOGRAM TRACING: CPT

## 2023-05-05 PROCEDURE — 80048 BASIC METABOLIC PNL TOTAL CA: CPT | Performed by: UROLOGY

## 2023-05-05 PROCEDURE — 85027 COMPLETE CBC AUTOMATED: CPT | Performed by: UROLOGY

## 2023-05-05 RX ORDER — FENOFIBRATE 134 MG/1
134 CAPSULE ORAL
COMMUNITY

## 2023-05-05 RX ORDER — MULTIPLE VITAMINS W/ MINERALS TAB 9MG-400MCG
1 TAB ORAL DAILY
COMMUNITY

## 2023-05-05 NOTE — DISCHARGE INSTRUCTIONS
PRE-ADMISSION TESTING INSTRUCTIONS FOR ADULTS    Take these medications the morning of surgery with a small sip of water:   pepcid and fenofibrate      Do not take any insulin or diabetes medications the morning of surgery.      No aspirin, advil, aleve, ibuprofen, naproxen, diet pills, decongestants, or herbal/vitamins for a week prior to surgery.   Stop vitamin and vascepa a week prior to surgery    Tylenol/Acetaminophen is okay to take if needed.    General Instructions:    DO NOT EAT SOLID FOOD AFTER MIDNIGHT THE NIGHT BEFORE SURGERY. No gum, mints, or hard candy after midnight the night before surgery.  You may drink clear liquids the day of surgery up until 2 hours before your arrival time.  (11:00 am)  Clear liquids are liquids you can see through. Nothing RED in color.    Plain water    Sports drinks      Gelatin (Jell-O)  Fruit juices without pulp such as white grape juice and apple juice  Popsicles that contain no fruit or yogurt  Tea or coffee (no cream or milk added)    It is beneficial for you to have a clear drink that contains carbohydrates 2 hours before your arrival time.  We suggest a 20 ounce bottle of Gatorade or Powerade for non-diabetic patients or a 20 ounce bottle of Gatorade Zero or Powerade Zero for diabetic patients.   (11:00 am)    Patients who avoid smoking, chewing tobacco and alcohol for 4 weeks prior to surgery have a reduced risk of post-operative complications.  If at all possible, quit smoking as many days before surgery as you can.    Do not smoke, use chewing tobacco or drink alcohol the day of surgery    Bring your C-PAP/ BI-PAP machine if you use one.  Wear clean comfortable clothes.  Do not wear contact lenses, lotion, deodorant, or make-up.  Bring a case for your glasses if applicable. You may brush your teeth the morning of surgery.  You may wear dentures/partials, do not put adhesive/glue on them.  Leave all other jewelry and valuables at home.      Preventing a Surgical  Site Infection:    Shower the night before and on the morning of surgery using the chlorhexidine soap you were given.  Use a clean washcloth with the soap.  Place clean sheets on your bed after showering the night before surgery. Do not use the CHG soap on your hair, face, or private areas. Wash your body gently for five (5) minutes. Do not scrub your skin.  Dry with a clean towel and dress in clean clothing.  Do not shave the surgical area for 10 days-2 weeks prior to surgery  because the razor can irritate skin and make it easier to develop an infection.  Make sure you, your family, and all healthcare providers clean their hands with soap and water or an alcohol based hand  before caring for you or your wound.      Day of surgery:    Your surgeon’s office will advise you of your arrival time for the day of surgery.    Upon arrival, a Pre-op nurse and Anesthesia provider will review your health history, obtain vital signs, and answer questions you may have. The anesthesia provider will also discuss the type of anesthesia that will be needed for your procedure, which may include general anesthesia. The only belongings needed at this time will be your home medications and if applicable your C-PAP/BI-PAP machine.  If you are staying overnight your family can leave the rest of your belongings in the car and bring them to your room later.  A Pre-op nurse will start an IV and you may receive medication in preparation for surgery, including something to help you relax.  Your family will be able to see you in the Pre-op area.  While you are in surgery your family should notify the waiting room  if they leave the waiting room area and provide a contact phone number.    IF you have any questions, you can call the Pre-Admission Department at [phone] or your surgeon's office.  Notify your surgeon if  you become sick, have a fever, productive cough, or cannot be here the day of surgery    Please be  aware that surgery does come with discomfort.  We want to make every effort to control your discomfort so please discuss any uncontrolled symptoms with your nurse.   Your doctor will most likely have prescribed pain medications.      If you are going home after surgery, you will receive individualized written care instructions before being discharged.  A responsible adult (over the age of 18) must drive you to and from the hospital on the day of your surgery and stay with you for 24 hours after anesthesia.    If you are staying overnight following surgery, you will be transported to your hospital room following the recovery period.  [facility] has all private rooms.    You may receive a survey regarding the care you received. Your feedback is very important and will be used to collect the necessary data to help us to continue to provide excellent care.     Deductibles and co-payments are collected on the day of service. Please be prepared to pay the required co-pay, deductible or deposit on the day of service as defined by your plan.

## 2023-05-12 ENCOUNTER — ANESTHESIA EVENT (OUTPATIENT)
Dept: PERIOP | Facility: HOSPITAL | Age: 58
End: 2023-05-12
Payer: COMMERCIAL

## 2023-05-15 ENCOUNTER — HOSPITAL ENCOUNTER (OUTPATIENT)
Facility: HOSPITAL | Age: 58
Setting detail: HOSPITAL OUTPATIENT SURGERY
Discharge: HOME | End: 2023-05-15
Attending: UROLOGY | Admitting: UROLOGY
Payer: COMMERCIAL

## 2023-05-15 ENCOUNTER — ANESTHESIA (OUTPATIENT)
Dept: PERIOP | Facility: HOSPITAL | Age: 58
End: 2023-05-15
Payer: COMMERCIAL

## 2023-05-15 VITALS
BODY MASS INDEX: 27.44 KG/M2 | SYSTOLIC BLOOD PRESSURE: 146 MMHG | DIASTOLIC BLOOD PRESSURE: 83 MMHG | WEIGHT: 208 LBS | TEMPERATURE: 97.7 F | RESPIRATION RATE: 15 BRPM | OXYGEN SATURATION: 95 % | HEART RATE: 70 BPM

## 2023-05-15 DIAGNOSIS — N43.40 SPERMATOCELE OF EPIDIDYMIS, UNSPECIFIED: Primary | ICD-10-CM

## 2023-05-15 PROBLEM — N43.3 HYDROCELE IN ADULT: Status: ACTIVE | Noted: 2023-05-15

## 2023-05-15 PROCEDURE — 25010000002 MIDAZOLAM PER 1MG: Performed by: NURSE ANESTHETIST, CERTIFIED REGISTERED

## 2023-05-15 PROCEDURE — 25010000002 PROPOFOL 200 MG/20ML EMULSION: Performed by: NURSE ANESTHETIST, CERTIFIED REGISTERED

## 2023-05-15 PROCEDURE — 25010000002 DEXAMETHASONE PER 1 MG: Performed by: NURSE ANESTHETIST, CERTIFIED REGISTERED

## 2023-05-15 PROCEDURE — 0 CEFAZOLIN SODIUM-DEXTROSE 2-3 GM-%(50ML) RECONSTITUTED SOLUTION: Performed by: UROLOGY

## 2023-05-15 PROCEDURE — 25010000002 ONDANSETRON PER 1 MG: Performed by: NURSE ANESTHETIST, CERTIFIED REGISTERED

## 2023-05-15 RX ORDER — KETAMINE HYDROCHLORIDE 10 MG/ML
INJECTION INTRAMUSCULAR; INTRAVENOUS AS NEEDED
Status: DISCONTINUED | OUTPATIENT
Start: 2023-05-15 | End: 2023-05-15 | Stop reason: SURG

## 2023-05-15 RX ORDER — ONDANSETRON 4 MG/1
4 TABLET, FILM COATED ORAL DAILY PRN
Qty: 10 TABLET | Refills: 1 | Status: SHIPPED | OUTPATIENT
Start: 2023-05-15

## 2023-05-15 RX ORDER — DEXAMETHASONE SODIUM PHOSPHATE 4 MG/ML
8 INJECTION, SOLUTION INTRA-ARTICULAR; INTRALESIONAL; INTRAMUSCULAR; INTRAVENOUS; SOFT TISSUE ONCE AS NEEDED
Status: COMPLETED | OUTPATIENT
Start: 2023-05-15 | End: 2023-05-15

## 2023-05-15 RX ORDER — SODIUM CHLORIDE 0.9 % (FLUSH) 0.9 %
10 SYRINGE (ML) INJECTION AS NEEDED
Status: DISCONTINUED | OUTPATIENT
Start: 2023-05-15 | End: 2023-05-15 | Stop reason: HOSPADM

## 2023-05-15 RX ORDER — BUPIVACAINE HYDROCHLORIDE 2.5 MG/ML
INJECTION, SOLUTION EPIDURAL; INFILTRATION; INTRACAUDAL AS NEEDED
Status: DISCONTINUED | OUTPATIENT
Start: 2023-05-15 | End: 2023-05-15 | Stop reason: HOSPADM

## 2023-05-15 RX ORDER — FAMOTIDINE 10 MG/ML
20 INJECTION, SOLUTION INTRAVENOUS
Status: COMPLETED | OUTPATIENT
Start: 2023-05-15 | End: 2023-05-15

## 2023-05-15 RX ORDER — SODIUM CHLORIDE 9 MG/ML
40 INJECTION, SOLUTION INTRAVENOUS AS NEEDED
Status: DISCONTINUED | OUTPATIENT
Start: 2023-05-15 | End: 2023-05-15 | Stop reason: HOSPADM

## 2023-05-15 RX ORDER — ONDANSETRON 2 MG/ML
4 INJECTION INTRAMUSCULAR; INTRAVENOUS ONCE AS NEEDED
Status: DISCONTINUED | OUTPATIENT
Start: 2023-05-15 | End: 2023-05-15 | Stop reason: HOSPADM

## 2023-05-15 RX ORDER — LIDOCAINE HYDROCHLORIDE 10 MG/ML
0.5 INJECTION, SOLUTION EPIDURAL; INFILTRATION; INTRACAUDAL; PERINEURAL ONCE AS NEEDED
Status: DISCONTINUED | OUTPATIENT
Start: 2023-05-15 | End: 2023-05-15 | Stop reason: HOSPADM

## 2023-05-15 RX ORDER — PROPOFOL 10 MG/ML
INJECTION, EMULSION INTRAVENOUS AS NEEDED
Status: DISCONTINUED | OUTPATIENT
Start: 2023-05-15 | End: 2023-05-15 | Stop reason: SURG

## 2023-05-15 RX ORDER — OXYCODONE HYDROCHLORIDE AND ACETAMINOPHEN 5; 325 MG/1; MG/1
1-2 TABLET ORAL EVERY 4 HOURS PRN
Qty: 20 TABLET | Refills: 0 | Status: SHIPPED | OUTPATIENT
Start: 2023-05-15

## 2023-05-15 RX ORDER — FENTANYL CITRATE 50 UG/ML
50 INJECTION, SOLUTION INTRAMUSCULAR; INTRAVENOUS
Status: DISCONTINUED | OUTPATIENT
Start: 2023-05-15 | End: 2023-05-15 | Stop reason: HOSPADM

## 2023-05-15 RX ORDER — SODIUM CHLORIDE, SODIUM LACTATE, POTASSIUM CHLORIDE, CALCIUM CHLORIDE 600; 310; 30; 20 MG/100ML; MG/100ML; MG/100ML; MG/100ML
9 INJECTION, SOLUTION INTRAVENOUS CONTINUOUS PRN
Status: DISCONTINUED | OUTPATIENT
Start: 2023-05-15 | End: 2023-05-15 | Stop reason: HOSPADM

## 2023-05-15 RX ORDER — OXYCODONE HYDROCHLORIDE AND ACETAMINOPHEN 5; 325 MG/1; MG/1
1 TABLET ORAL ONCE AS NEEDED
Status: DISCONTINUED | OUTPATIENT
Start: 2023-05-15 | End: 2023-05-15 | Stop reason: HOSPADM

## 2023-05-15 RX ORDER — CEFAZOLIN SODIUM 2 G/50ML
2 SOLUTION INTRAVENOUS ONCE
Status: COMPLETED | OUTPATIENT
Start: 2023-05-15 | End: 2023-05-15

## 2023-05-15 RX ORDER — ONDANSETRON 2 MG/ML
4 INJECTION INTRAMUSCULAR; INTRAVENOUS ONCE AS NEEDED
Status: COMPLETED | OUTPATIENT
Start: 2023-05-15 | End: 2023-05-15

## 2023-05-15 RX ORDER — SODIUM CHLORIDE 0.9 % (FLUSH) 0.9 %
10 SYRINGE (ML) INJECTION EVERY 12 HOURS SCHEDULED
Status: DISCONTINUED | OUTPATIENT
Start: 2023-05-15 | End: 2023-05-15 | Stop reason: HOSPADM

## 2023-05-15 RX ORDER — BACITRACIN ZINC 500 [USP'U]/G
OINTMENT TOPICAL AS NEEDED
Status: DISCONTINUED | OUTPATIENT
Start: 2023-05-15 | End: 2023-05-15 | Stop reason: HOSPADM

## 2023-05-15 RX ORDER — LIDOCAINE HYDROCHLORIDE 20 MG/ML
INJECTION, SOLUTION INFILTRATION; PERINEURAL AS NEEDED
Status: DISCONTINUED | OUTPATIENT
Start: 2023-05-15 | End: 2023-05-15 | Stop reason: SURG

## 2023-05-15 RX ORDER — MIDAZOLAM HYDROCHLORIDE 2 MG/2ML
0.5 INJECTION, SOLUTION INTRAMUSCULAR; INTRAVENOUS
Status: DISCONTINUED | OUTPATIENT
Start: 2023-05-15 | End: 2023-05-15 | Stop reason: HOSPADM

## 2023-05-15 RX ADMIN — DEXAMETHASONE SODIUM PHOSPHATE 8 MG: 4 INJECTION, SOLUTION INTRAMUSCULAR; INTRAVENOUS at 13:34

## 2023-05-15 RX ADMIN — KETAMINE HYDROCHLORIDE 30 MG: 10 INJECTION INTRAMUSCULAR; INTRAVENOUS at 15:33

## 2023-05-15 RX ADMIN — PROPOFOL INJECTABLE EMULSION 200 MG: 10 INJECTION, EMULSION INTRAVENOUS at 15:33

## 2023-05-15 RX ADMIN — FAMOTIDINE 20 MG: 10 INJECTION, SOLUTION INTRAVENOUS at 13:34

## 2023-05-15 RX ADMIN — ONDANSETRON 4 MG: 2 INJECTION INTRAMUSCULAR; INTRAVENOUS at 13:34

## 2023-05-15 RX ADMIN — MIDAZOLAM HYDROCHLORIDE 0.5 MG: 1 INJECTION, SOLUTION INTRAMUSCULAR; INTRAVENOUS at 15:19

## 2023-05-15 RX ADMIN — CEFAZOLIN SODIUM 2 G: 2 SOLUTION INTRAVENOUS at 15:33

## 2023-05-15 RX ADMIN — SODIUM CHLORIDE, POTASSIUM CHLORIDE, SODIUM LACTATE AND CALCIUM CHLORIDE 9 ML/HR: 600; 310; 30; 20 INJECTION, SOLUTION INTRAVENOUS at 13:29

## 2023-05-15 RX ADMIN — LIDOCAINE HYDROCHLORIDE 100 MG: 20 INJECTION, SOLUTION INFILTRATION; PERINEURAL at 15:33

## 2023-05-15 NOTE — OP NOTE
Preoperative diagnosis symptomatic enlarging right hydrocele-spermatocele complex    Postoperative diagnosis large upper pole right spermatocele    Procedures right spermatocelectomy exploration scrotal    Surgeon Dayron    Anesthesia General plus quarter percent plain Marcaine cord block    Procedure note this at 7-year-old presenting himself with above-mentioned history and findings treatment options discussed undergo above-mentioned procedure    Site was marked antibiotics given time was taken COVID precautions no known drug allergies after adequate general anesthesia he was shaved over his genitalia prepped and draped sterile fashion a cord block was done in the right cord transverse incision was made through the right hemiscrotum 3 to the investing layers down to the parietal tunica vaginalis minimal amount of fluid was encountered and incision was made in the visceral tunica vaginalis and a very large spermatocele was decompressed drained and removed without difficulty it was left open with problem tunica vaginalis no active bleeding with normotensive return to the hemiscrotum hemostasis checked and verified reapproximation of the parietal tunica vaginalis with a running 4-0 chromic Bovie cautery providing hemostasis throughout the procedure and closure of the skin wound with interrupted horizontal mattress sutures of 4-0 chromic bacitracin ointment fluff gauze sponge dressing mesh panties ice pack in recovery room findings discussed with his wife patient procedure well was sent to the cover in satisfactory condition.    Disposition follow-up in the office about 10 days before he goes back to work in 2 weeks outpatient structure sheet given phone numbers given in case there is any questions or concerns to contact us progressive diet and activities Percocet and Zofran on discharge as well as bacitracin ointment twice daily for 7-day keeping ice on there for at least 48 hours

## 2023-05-15 NOTE — INTERVAL H&P NOTE
H&P reviewed. The patient was examined and there are no changes to the H&P.      /96 (BP Location: Left arm, Patient Position: Lying)   Pulse 73   Temp 98.4 °F (36.9 °C) (Oral)   Resp 15   Wt 94.3 kg (208 lb)   BMI 27.44 kg/m²

## 2023-05-15 NOTE — BRIEF OP NOTE
SPERMATOCELECTOMY  Progress Note    Clifford Zheng  5/15/2023    Pre-op Diagnosis:   N43.3       Post-Op Diagnosis Codes:  Right spermatocele    Procedure/CPT® Codes:        Procedure(s):  RIGHT spermatocelectomy         Surgeon(s):  Haroon Padgett MD    Anesthesia: General quarter percent plain Marcaine local block    Staff:   Circulator: Jacki Gaxiola RN  Scrub Person: Devika Lay         Estimated Blood Loss: Minimal    Urine Voided: * No values recorded between 5/15/2023  3:25 PM and 5/15/2023  4:05 PM *    Specimens:                None          Drains: * No LDAs found *    Findings: Very large right upper pole spermatocele remainder of the epididymis is normal testicles normal minimal hydrocele fluid        Complications: None          Haroon Padgett MD     Date: 5/15/2023  Time: 16:11 EDT

## 2023-05-15 NOTE — H&P
[facility] Physical    Patient Care Team:  Darcy Lake MD as PCP - General (Family Medicine)    Chief complaint RIGHT SCROTAL SWELLING     Subjective     Patient is a 57 y.o. male presents with enlarging now painful aaron with sitting right hydrocele       Review of Systems   No f/c     Past Medical History:   Diagnosis Date   • Bulging lumbar disc    • Colon polyp    • Elevated cholesterol    • GERD (gastroesophageal reflux disease)    • Shoulder pain     right   • Sleep apnea     no machine     Past Surgical History:   Procedure Laterality Date   • COLONOSCOPY     • COLONOSCOPY N/A 10/25/2019    Procedure: COLONOSCOPY;  Surgeon: Jimmy Gonzalez MD;  Location: [facility];  Service: Gastroenterology     Family History   Problem Relation Age of Onset   • Diabetes Mother    • Heart disease Mother    • Coronary artery disease Mother    • Heart disease Father    • Kidney failure Father         dialysis   • Heart attack Father    • Lung disease Maternal Grandmother    • Cancer Maternal Grandfather    • Colon cancer Maternal Grandfather    • Malig Hyperthermia Neg Hx      Social History     Tobacco Use   • Smoking status: Never   • Smokeless tobacco: Never   Vaping Use   • Vaping Use: Never used   Substance Use Topics   • Alcohol use: No   • Drug use: Never       Meds:  No medications prior to admission.       Allergies:  Patient has no known allergies.    Debilities:  None     Objective     Vital Signs     No intake or output data in the 24 hours ending 05/15/23 1036       Physical Exam:      General Appearance:    Alert, cooperative, in no acute distress   Head:    Normocephalic, without obvious abnormality, atraumatic   Eyes:            Lids and lashes normal, conjunctivae and sclerae normal, no   icterus, no pallor, corneas clear,      Ears:    Ears appear intact with no abnormalities noted   Throat:   No oral lesions, no thrush, oral mucosa moist   Neck:   No  adenopathy, supple, trachea midline,            , no JVD   Back:     No kyphosis present, no scoliosis present, no skin lesions,      erythema or scars, no tenderness to percussion or                   palpation,   range of motion normal   Lungs:       ,respirations regular, even and                  unlabored         Chest Wall:    No abnormalities observed   Abdomen:       no masses, no organomegaly, soft        non-tender, non-distended, no guarding, no rebound                tenderness   Rectal:    rt 14 cm rt fluid filled hemiscrotum tense    Extremities:    Pulses:    Skin:    Lymph nodes:    Neurologic:      Results Review:    I reviewed the patient's new clinical results.  Results for orders placed or performed in visit on 05/05/23   Basic Metabolic Panel    Specimen: Blood   Result Value Ref Range    Glucose 109 (H) 65 - 99 mg/dL    BUN 23 (H) 6 - 20 mg/dL    Creatinine 1.34 (H) 0.76 - 1.27 mg/dL    Sodium 140 136 - 145 mmol/L    Potassium 4.3 3.5 - 5.2 mmol/L    Chloride 107 98 - 107 mmol/L    CO2 23.5 22.0 - 29.0 mmol/L    Calcium 9.8 8.6 - 10.5 mg/dL    BUN/Creatinine Ratio 17.2 7.0 - 25.0    Anion Gap 9.5 5.0 - 15.0 mmol/L    eGFR 61.8 >60.0 mL/min/1.73   CBC (No Diff)    Specimen: Blood   Result Value Ref Range    WBC 4.74 3.40 - 10.80 10*3/mm3    RBC 4.62 4.14 - 5.80 10*6/mm3    Hemoglobin 14.4 13.0 - 17.7 g/dL    Hematocrit 42.6 37.5 - 51.0 %    MCV 92.2 79.0 - 97.0 fL    MCH 31.2 26.6 - 33.0 pg    MCHC 33.8 31.5 - 35.7 g/dL    RDW 11.5 (L) 12.3 - 15.4 %    RDW-SD 39.4 37.0 - 54.0 fl    MPV 10.9 6.0 - 12.0 fL    Platelets 169 140 - 450 10*3/mm3   ECG 12 Lead   Result Value Ref Range    QT Interval 398 ms        Assessment:enlarging right hydrocele pain      Plan:    RIGHT HYDROCELECTOMY  R/B/O D/W PT NOT LIMITED TO INFECTION BLEEDING RECURRENCE PAIN TESTIS ATROPHY PT UNDERSTANDS AGREES TO PROCEED    I discussed the patient's findings and my recommendations with patient.     Haroon Padgett,  MD  05/15/23  10:36 EDT

## 2023-05-15 NOTE — ANESTHESIA PREPROCEDURE EVALUATION
Anesthesia Evaluation     Patient summary reviewed and Nursing notes reviewed   no history of anesthetic complications:  NPO Solid Status: > 8 hours  NPO Liquid Status: > 8 hours           Airway   Mallampati: II  TM distance: <3 FB  Neck ROM: full  No difficulty expected  Dental - normal exam     Pulmonary - normal exam   (+) sleep apnea (no compliant),   (-) shortness of breath  Cardiovascular - normal exam  Exercise tolerance: good (4-7 METS)    ECG reviewed    (+) hyperlipidemia,   (-) angina      Neuro/Psych  GI/Hepatic/Renal/Endo    (+)  GERD well controlled,      Musculoskeletal (-) negative ROS    Abdominal  - normal exam   Substance History - negative use     OB/GYN negative ob/gyn ROS         Other - negative ROS                       Anesthesia Plan    ASA 2     general     intravenous induction     Anesthetic plan, risks, benefits, and alternatives have been provided, discussed and informed consent has been obtained with: patient.    Use of blood products discussed with patient  Consented to blood products.       CODE STATUS:

## 2023-05-15 NOTE — ANESTHESIA PROCEDURE NOTES
Airway  Urgency: elective    Date/Time: 5/15/2023 3:33 PM  Airway not difficult    General Information and Staff    Patient location during procedure: OR    Indications and Patient Condition  Indications for airway management: airway protection    Preoxygenated: yes  Mask difficulty assessment: 0 - not attempted    Final Airway Details  Final airway type: supraglottic airway      Successful airway: unique  Size 4     Number of attempts at approach: 1  Assessment: lips, teeth, and gum same as pre-op

## 2023-05-15 NOTE — ANESTHESIA POSTPROCEDURE EVALUATION
Patient: Clifford Zheng    Procedure Summary     Date: 05/15/23 Room / Location:  [facility] OR 4 /  [facility]    Anesthesia Start: 1525 Anesthesia Stop: 1619    Procedure: RIGHT spermatocele  (Right: Scrotum) Diagnosis: (N43.3)    Surgeons: Haroon Padgett MD Provider: Paulie Larson CRNA    Anesthesia Type: general ASA Status: 2          Anesthesia Type: general    Vitals  Vitals Value Taken Time   /82 05/15/23 1655   Temp 97.7 °F (36.5 °C) 05/15/23 1615   Pulse 71 05/15/23 1655   Resp 17 05/15/23 1650   SpO2 95 % 05/15/23 1655   Vitals shown include unvalidated device data.        Post Anesthesia Care and Evaluation    Patient location during evaluation: bedside  Patient participation: complete - patient participated  Level of consciousness: awake and alert  Pain score: 2  Pain management: adequate    Airway patency: patent  Anesthetic complications: No anesthetic complications  PONV Status: none  Cardiovascular status: acceptable  Respiratory status: acceptable  Hydration status: acceptable

## 2024-03-06 ENCOUNTER — OFFICE VISIT (OUTPATIENT)
Dept: SURGERY | Facility: CLINIC | Age: 59
End: 2024-03-06
Payer: COMMERCIAL

## 2024-03-06 VITALS
BODY MASS INDEX: 27.59 KG/M2 | SYSTOLIC BLOOD PRESSURE: 156 MMHG | DIASTOLIC BLOOD PRESSURE: 98 MMHG | WEIGHT: 208.2 LBS | HEIGHT: 73 IN

## 2024-03-06 DIAGNOSIS — K42.9 UMBILICAL HERNIA WITHOUT OBSTRUCTION AND WITHOUT GANGRENE: ICD-10-CM

## 2024-03-06 DIAGNOSIS — L72.3 SEBACEOUS CYST: Primary | ICD-10-CM

## 2024-03-06 RX ORDER — ROSUVASTATIN CALCIUM 20 MG/1
TABLET, COATED ORAL
COMMUNITY
Start: 2024-02-07

## 2024-03-06 NOTE — PROGRESS NOTES
Colorectal & General Surgery  Consultation    Patient: Clifford Zheng  YOB: 1965  MRN: [id number]      Assessment  Clifford Zheng is a 58 y.o. male with noninfected posterior neck cyst and chronically incarcerated umbilical hernia.    I suspect his neck cyst is a sebaceous cyst.  There is no sign of infection at this time and no indication for urgent excision.  I discussed the risk, benefits, and alternatives to posterior neck excision, including the risk of wound complications.  He is interested in proceeding but would like to find a time when it would be easy for him to be off of work for a few days.  I gave him the information necessary to call our office and schedule such an appointment and placed orders for excision of posterior neck excision.    Regarding his umbilical hernia, it is soft and chronically incarcerated.  Has not caused him any trouble for years and has been stable for that same amount of time.  I discussed that proceeding with umbilical hernia repair at some point is in his best interest, but given that he does not want to take a lot of time off work and his final year of working, I do think it would be safe to wait until he is retired.  We discussed indications to return based off of worsening of his symptoms.    Plan  Proceed with excision of posterior neck cyst.  Patient to call and schedule when he is ready.  Continue conservative management of his umbilical hernia with plans to address once he retires.    Referring Provider: Darcy Lake MD     Reason for Consultation: Posterior neck cyst    History of Present Illness   Clifford Zheng is a 58 y.o. male who presents to the office today to discuss a posterior neck cyst.  He says it has been present for many years and is never really caused him much problem.  Every month small he says it gets a little reddened and angry, but it is not draining.  He feels it has slowly grown over the past couple years but no significant  difference recently.  It does not cause him much pain.    He also discusses an umbilical hernia has been present for many years and causes him no significant pain or discomfort.  It has not changed over the last few months.    Past Medical History   Past Medical History:   Diagnosis Date    Bulging lumbar disc     Colon polyp     Elevated cholesterol     GERD (gastroesophageal reflux disease)     Shoulder pain     right    Sleep apnea     no machine        Past Surgical History   Past Surgical History:   Procedure Laterality Date    COLONOSCOPY N/A 10/25/2019    Procedure: COLONOSCOPY;  Surgeon: Jimmy Gonzalez MD;  Location: [facility] OR;  Service: Gastroenterology    SPERMATOCELECTOMY Right 05/15/2023    Procedure: RIGHT spermatocele ;  Surgeon: Haroon Padgett MD;  Location: [facility] OR;  Service: Urology;  Laterality: Right;       Social History  Social History     Socioeconomic History    Marital status:    Tobacco Use    Smoking status: Never    Smokeless tobacco: Never   Vaping Use    Vaping status: Never Used   Substance and Sexual Activity    Alcohol use: No    Drug use: Never    Sexual activity: Yes     Partners: Female     Birth control/protection: None       Family History  Family History   Problem Relation Age of Onset    Diabetes Mother     Heart disease Mother     Coronary artery disease Mother     Arthritis Mother     Hypertension Mother     Heart disease Father     Kidney failure Father         dialysis    Heart attack Father     Kidney disease Father     Lung disease Maternal Grandmother     Cancer Maternal Grandfather     Colon cancer Maternal Grandfather     Malig Hyperthermia Neg Hx      Review of Systems  Negative except as documented in the HPI.     Allergies  No Known Allergies    Medications    Current Outpatient Medications:     famotidine (PEPCID) 40 MG tablet, Take 1 tablet by mouth Daily. Take dos, Disp: , Rfl:     fenofibrate micronized (LOFIBRA) 134 MG capsule, Take 1  capsule by mouth Every Morning Before Breakfast. Take dos, Disp: , Rfl:     multivitamin with minerals tablet tablet, Take 1 tablet by mouth Daily. Hold 7d prior dos, Disp: , Rfl:     rosuvastatin (CRESTOR) 20 MG tablet, , Disp: , Rfl:     VASCEPA 1 g capsule capsule, Take 1 g by mouth 2 (Two) Times a Day With Meals. Hold 7d prior dos, Disp: , Rfl:     vitamin D (ERGOCALCIFEROL) 07967 units capsule capsule, Take 1 capsule by mouth Every 7 (Seven) Days. Hold 7d prior dos, Disp: , Rfl:     Vital Signs  Vitals:    03/06/24 0952   BP: 156/98        Physical Exam  Constitutional: Resting comfortably, no acute distress  Neck: Supple, trachea midline  Respiratory: No increased work of breathing, Symmetric excursion  Cardiovascular: Well pefursed, no jugular venous distention evident   Abdominal: Soft incarcerated umbilical hernia, relatively large.  Soft, non-tender, non-distended  Lymphatics: No cervical or suprascapular adenopathy  Skin: Warm, dry, no rash on visualized skin surfaces.  Soft posterior neck cyst approximately 4 cm wide and 3 cm tall that appears to be relatively deep.  No significant overlying erythema or drainage.  Musculoskeletal: Symmetric strength, no obvious gross abnormalities  Psychiatric: Alert and oriented ×3, normal affect      Laboratory Results  I have personally reviewed CBC with WBC 4, hemoglobin 14, platelets 169.  BMP with creatinine 1.34, glucose 109, bicarb 23.    Radiology  None pertinent    Endoscopy  I have personally reviewed colonoscopy report from 2014 with multiple polyps.  Pathology report from colonoscopy performed 2019 with 2 polyps, both developing hyperplastic polyps.         Max Barber MD  Colorectal & General Surgery  [facility]    [address], [address]  [place], [place], [address]  P: [phone]  F: [phone]

## 2024-03-08 ENCOUNTER — PATIENT ROUNDING (BHMG ONLY) (OUTPATIENT)
Dept: SURGERY | Facility: CLINIC | Age: 59
End: 2024-03-08
Payer: COMMERCIAL

## 2024-03-08 NOTE — PROGRESS NOTES
March 8, 2024    Hello, may I speak with Clifford Zheng?    My name is Rubi      I am  with [facility]  [address]  [place] [address] [phone].    Before we get started may I verify your date of birth? 1965    I am calling to officially welcome you to our practice and ask about your recent visit. Is this a good time to talk? yes    Tell me about your visit with us. What things went well?  Everything went well.        We're always looking for ways to make our patients' experiences even better. Do you have recommendations on ways we may improve?  no    Overall were you satisfied with your first visit to our practice? yes       I appreciate you taking the time to speak with me today. Is there anything else I can do for you? no      Thank you, and have a great day.

## 2024-03-18 ENCOUNTER — TRANSCRIBE ORDERS (OUTPATIENT)
Dept: CT IMAGING | Facility: HOSPITAL | Age: 59
End: 2024-03-18
Payer: COMMERCIAL

## 2024-03-18 DIAGNOSIS — Q25.1 COARCTATION OF AORTA: Primary | ICD-10-CM

## 2024-03-29 ENCOUNTER — HOSPITAL ENCOUNTER (OUTPATIENT)
Dept: CT IMAGING | Facility: HOSPITAL | Age: 59
Discharge: HOME | End: 2024-03-29
Admitting: FAMILY MEDICINE
Payer: COMMERCIAL

## 2024-03-29 DIAGNOSIS — Q25.1 COARCTATION OF AORTA: ICD-10-CM

## 2024-03-29 PROCEDURE — 71250 CT THORAX DX C-: CPT

## 2024-07-26 ENCOUNTER — TRANSCRIBE ORDERS (OUTPATIENT)
Dept: ADMINISTRATIVE | Facility: HOSPITAL | Age: 59
End: 2024-07-26
Payer: COMMERCIAL

## 2024-07-26 DIAGNOSIS — N18.31 STAGE 3A CHRONIC KIDNEY DISEASE: Primary | ICD-10-CM

## 2025-04-15 DIAGNOSIS — Z86.0101 PERSONAL HISTORY OF ADENOMATOUS AND SERRATED COLON POLYPS: Primary | ICD-10-CM

## 2025-06-12 ENCOUNTER — ANESTHESIA EVENT (OUTPATIENT)
Dept: PERIOP | Facility: HOSPITAL | Age: 60
End: 2025-06-12
Payer: COMMERCIAL

## 2025-06-13 ENCOUNTER — ANESTHESIA (OUTPATIENT)
Dept: PERIOP | Facility: HOSPITAL | Age: 60
End: 2025-06-13
Payer: COMMERCIAL

## 2025-06-13 ENCOUNTER — HOSPITAL ENCOUNTER (OUTPATIENT)
Facility: HOSPITAL | Age: 60
Setting detail: HOSPITAL OUTPATIENT SURGERY
Discharge: HOME | End: 2025-06-13
Attending: INTERNAL MEDICINE | Admitting: INTERNAL MEDICINE
Payer: COMMERCIAL

## 2025-06-13 VITALS
WEIGHT: 200 LBS | SYSTOLIC BLOOD PRESSURE: 117 MMHG | TEMPERATURE: 98.2 F | DIASTOLIC BLOOD PRESSURE: 79 MMHG | HEIGHT: 73 IN | RESPIRATION RATE: 18 BRPM | BODY MASS INDEX: 26.51 KG/M2 | OXYGEN SATURATION: 97 % | HEART RATE: 67 BPM

## 2025-06-13 DIAGNOSIS — Z86.0101 PERSONAL HISTORY OF ADENOMATOUS AND SERRATED COLON POLYPS: ICD-10-CM

## 2025-06-13 LAB
C DIFF GDH + TOXINS A+B STL QL IA.RAPID: NEGATIVE
C DIFF GDH + TOXINS A+B STL QL IA.RAPID: NEGATIVE

## 2025-06-13 PROCEDURE — 87324 CLOSTRIDIUM AG IA: CPT | Performed by: INTERNAL MEDICINE

## 2025-06-13 PROCEDURE — 25810000003 LACTATED RINGERS PER 1000 ML: Performed by: NURSE ANESTHETIST, CERTIFIED REGISTERED

## 2025-06-13 PROCEDURE — 25010000002 PROPOFOL 200 MG/20ML EMULSION: Performed by: NURSE ANESTHETIST, CERTIFIED REGISTERED

## 2025-06-13 PROCEDURE — 45378 DIAGNOSTIC COLONOSCOPY: CPT | Performed by: INTERNAL MEDICINE

## 2025-06-13 PROCEDURE — 87449 NOS EACH ORGANISM AG IA: CPT | Performed by: INTERNAL MEDICINE

## 2025-06-13 RX ORDER — SODIUM CHLORIDE 0.9 % (FLUSH) 0.9 %
10 SYRINGE (ML) INJECTION EVERY 12 HOURS SCHEDULED
Status: DISCONTINUED | OUTPATIENT
Start: 2025-06-13 | End: 2025-06-13 | Stop reason: HOSPADM

## 2025-06-13 RX ORDER — SODIUM CHLORIDE 0.9 % (FLUSH) 0.9 %
10 SYRINGE (ML) INJECTION AS NEEDED
Status: DISCONTINUED | OUTPATIENT
Start: 2025-06-13 | End: 2025-06-13 | Stop reason: HOSPADM

## 2025-06-13 RX ORDER — ONDANSETRON 2 MG/ML
4 INJECTION INTRAMUSCULAR; INTRAVENOUS ONCE AS NEEDED
Status: DISCONTINUED | OUTPATIENT
Start: 2025-06-13 | End: 2025-06-13 | Stop reason: HOSPADM

## 2025-06-13 RX ORDER — SODIUM CHLORIDE 9 MG/ML
40 INJECTION, SOLUTION INTRAVENOUS AS NEEDED
Status: DISCONTINUED | OUTPATIENT
Start: 2025-06-13 | End: 2025-06-13 | Stop reason: HOSPADM

## 2025-06-13 RX ORDER — SODIUM CHLORIDE, SODIUM LACTATE, POTASSIUM CHLORIDE, CALCIUM CHLORIDE 600; 310; 30; 20 MG/100ML; MG/100ML; MG/100ML; MG/100ML
9 INJECTION, SOLUTION INTRAVENOUS CONTINUOUS
Status: DISCONTINUED | OUTPATIENT
Start: 2025-06-13 | End: 2025-06-13 | Stop reason: HOSPADM

## 2025-06-13 RX ORDER — LIDOCAINE HYDROCHLORIDE 10 MG/ML
0.5 INJECTION, SOLUTION EPIDURAL; INFILTRATION; INTRACAUDAL; PERINEURAL ONCE AS NEEDED
Status: DISCONTINUED | OUTPATIENT
Start: 2025-06-13 | End: 2025-06-13 | Stop reason: HOSPADM

## 2025-06-13 RX ORDER — DIPHENHYDRAMINE HYDROCHLORIDE 50 MG/ML
12.5 INJECTION, SOLUTION INTRAMUSCULAR; INTRAVENOUS
Status: DISCONTINUED | OUTPATIENT
Start: 2025-06-13 | End: 2025-06-13 | Stop reason: HOSPADM

## 2025-06-13 RX ORDER — PROPOFOL 10 MG/ML
INJECTION, EMULSION INTRAVENOUS AS NEEDED
Status: DISCONTINUED | OUTPATIENT
Start: 2025-06-13 | End: 2025-06-13 | Stop reason: SURG

## 2025-06-13 RX ADMIN — PROPOFOL 100 MG: 10 INJECTION, EMULSION INTRAVENOUS at 09:14

## 2025-06-13 RX ADMIN — PROPOFOL 100 MG: 10 INJECTION, EMULSION INTRAVENOUS at 09:20

## 2025-06-13 RX ADMIN — PROPOFOL 50 MG: 10 INJECTION, EMULSION INTRAVENOUS at 09:16

## 2025-06-13 RX ADMIN — SODIUM CHLORIDE, POTASSIUM CHLORIDE, SODIUM LACTATE AND CALCIUM CHLORIDE 9 ML/HR: 600; 310; 30; 20 INJECTION, SOLUTION INTRAVENOUS at 08:15

## 2025-06-13 NOTE — BRIEF OP NOTE
COLONOSCOPY  Progress Note    Clifford Zheng  6/13/2025    Pre-op Diagnosis:   Personal history of adenomatous and serrated colon polyps [Z86.0101]       Post-Op Diagnosis Codes:     * Personal history of adenomatous and serrated colon polyps [Z86.0101]     * Diverticulosis [K57.90]     * Colitis [K52.9]    Procedure(s):      Procedure(s):  COLONOSCOPY              Surgeon(s):  Jimmy Gonzalez MD    Anesthesia: Monitored Anesthesia Care    Staff:   Circulator: Ty Soriano RN  Scrub Person: Kyleigh Weston PCT       Estimated Blood Loss: none    Urine Voided: * No values recorded between 6/13/2025  9:10 AM and 6/13/2025  9:24 AM *    Specimens:                Specimens       ID Source Type Tests Collected By Collected At Frozen?    1 Per Rectum Stool CLOSTRIDIOIDES DIFFICILE TOXIN   Jimmy Gonzalez MD 6/13/25 0920               Drains: * No LDAs found *    Findings: Colon to TI good prep  Sigoid Diveticulosis  Patchy Colitis sigmoid Colon-SS sent      Complications: none          Jimmy Gonzalez MD     Date: 6/13/2025  Time: 09:26 EDT

## 2025-06-13 NOTE — H&P
Patient Care Team:  Darcy Lake MD as PCP - General (Family Medicine)    CHIEF COMPLAINT: Personal hx colon polyps    HISTORY OF PRESENT ILLNESS:  Last exam was 2019    Past Medical History:   Diagnosis Date    Bulging lumbar disc     Colon polyp     Elevated cholesterol     GERD (gastroesophageal reflux disease)     Shoulder pain     right    Sleep apnea     no machine     Past Surgical History:   Procedure Laterality Date    COLONOSCOPY N/A 10/25/2019    Procedure: COLONOSCOPY;  Surgeon: Jimmy Gonzalez MD;  Location: [facility] OR;  Service: Gastroenterology    SPERMATOCELECTOMY Right 05/15/2023    Procedure: RIGHT spermatocele ;  Surgeon: Haroon Padgett MD;  Location: [facility] OR;  Service: Urology;  Laterality: Right;     Family History   Problem Relation Age of Onset    Diabetes Mother     Heart disease Mother     Coronary artery disease Mother     Arthritis Mother     Hypertension Mother     Heart disease Father     Kidney failure Father         dialysis    Heart attack Father     Kidney disease Father     Lung disease Maternal Grandmother     Cancer Maternal Grandfather     Colon cancer Maternal Grandfather     Malig Hyperthermia Neg Hx      Social History     Tobacco Use    Smoking status: Never    Smokeless tobacco: Never   Vaping Use    Vaping status: Never Used   Substance Use Topics    Alcohol use: No    Drug use: Never     Medications Prior to Admission   Medication Sig Dispense Refill Last Dose/Taking    famotidine (PEPCID) 40 MG tablet Take 1 tablet by mouth Daily. Take dos   Past Week    fenofibrate micronized (LOFIBRA) 134 MG capsule Take 1 capsule by mouth Every Morning Before Breakfast. Take dos   Past Week    multivitamin with minerals tablet tablet Take 1 tablet by mouth Daily. Hold 7d prior dos   Past Week    rosuvastatin (CRESTOR) 20 MG tablet    Past Week    VASCEPA 1 g capsule capsule Take 1 g by mouth 2 (Two) Times a Day With Meals. Hold 7d prior dos   Past Week     "vitamin D (ERGOCALCIFEROL) 96386 units capsule capsule Take 1 capsule by mouth Every 7 (Seven) Days. Hold 7d prior dos   Past Week     Allergies:  Patient has no known allergies.    REVIEW OF SYSTEMS:  Please see the above history of present illness for pertinent positives and negatives.  The remainder of the patient's systems have been reviewed and are negative.     Vital Signs  Temp:  [98.1 °F (36.7 °C)] 98.1 °F (36.7 °C)  Heart Rate:  [76] 76  Resp:  [18] 18  BP: (133)/(76) 133/76    Flowsheet Rows      Flowsheet Row First Filed Value   Admission Height 185.4 cm (73\") Documented at 06/13/2025 0812   Admission Weight 90.7 kg (200 lb) Documented at 06/13/2025 0812             Physical Exam:  Physical Exam   Constitutional: Patient appears well-developed and well-nourished and in no acute distress   HEENT:   Head: Normocephalic and atraumatic.   Eyes:  Pupils are equal, round, and reactive to light. EOM are intact. Sclerae are anicteric and non-injected.  Mouth and Throat: Patient has moist mucous membranes. Oropharynx is clear of any erythema or exudate.     Neck: Neck supple. No JVD present. No thyromegaly present. No lymphadenopathy present.  Cardiovascular: Regular rate, regular rhythm, S1 normal and S2 normal.  Exam reveals no gallop and no friction rub.  No murmur heard.  Pulmonary/Chest: Lungs are clear to auscultation bilaterally. No respiratory distress. No wheezes. No rhonchi. No rales.   Abdominal: Soft. Bowel sounds are normal. No distension and no mass. There is no hepatosplenomegaly. There is no tenderness.   Musculoskeletal: Normal Muscle tone  Extremities: No edema. Pulses are palpable in all 4 extremities.  Neurological: Patient is alert and oriented to person, place, and time. Cranial nerves II-XII are grossly intact with no focal deficits.  Skin: Skin is warm. No rash noted. Nails show no clubbing.  No cyanosis or erythema.    Debilities/Disabilities Identified: None  Emotional Behavior: " Appropriate     Results Review:   I reviewed the patient's new clinical results.    Lab Results (most recent)       None            Imaging Results (Most Recent)       None          reviewed    ECG/EMG Results (most recent)       None          reviewed    Assessment & Plan   Personal hx colon polyps/  colonoscopy      I discussed the patient's findings and my recommendations with patient.     Jimmy Gonzalez MD  06/13/25  09:05 EDT    Time: 10 min prior to procedure.

## 2025-06-13 NOTE — ANESTHESIA POSTPROCEDURE EVALUATION
Patient: Clifford Zheng    Procedure Summary       Date: 06/13/25 Room / Location: [facility] ENDOSCOPY 1 /  [facility] OR    Anesthesia Start: 0910 Anesthesia Stop: 0925    Procedure: COLONOSCOPY Diagnosis:       Personal history of adenomatous and serrated colon polyps      Diverticulosis      Colitis      (Personal history of adenomatous and serrated colon polyps [Z86.0101])    Surgeons: Jimmy Gonzalez MD Provider: Timo Jara CRNA    Anesthesia Type: MAC ASA Status: 2            Anesthesia Type: MAC    Vitals  Vitals Value Taken Time   /67 06/13/25 09:40   Temp 98.2 °F (36.8 °C) 06/13/25 09:31   Pulse 67 06/13/25 09:50   Resp 18 06/13/25 09:31   SpO2 97 % 06/13/25 09:50   Vitals shown include unfiled device data.        Post Anesthesia Care and Evaluation    Patient location during evaluation: bedside  Patient participation: complete - patient participated  Level of consciousness: awake and alert  Pain score: 0  Pain management: adequate    Airway patency: patent  Anesthetic complications: No anesthetic complications  PONV Status: none  Cardiovascular status: acceptable  Respiratory status: acceptable  Hydration status: acceptable  No anesthesia care post op

## 2025-06-13 NOTE — ANESTHESIA PREPROCEDURE EVALUATION
Anesthesia Evaluation     Patient summary reviewed and Nursing notes reviewed   no history of anesthetic complications:   NPO Solid Status: > 8 hours  NPO Liquid Status: > 8 hours           Airway   Mallampati: II  TM distance: >3 FB  Neck ROM: full  No difficulty expected  Dental - normal exam     Pulmonary    (+) ,sleep apnea  Cardiovascular   Exercise tolerance: good (4-7 METS)    (+) hyperlipidemia      Neuro/Psych- negative ROS  GI/Hepatic/Renal/Endo    (+) GERD well controlled    Musculoskeletal     Abdominal    Substance History - negative use     OB/GYN          Other   arthritis,                 Anesthesia Plan    ASA 2     MAC     intravenous induction     Anesthetic plan, risks, benefits, and alternatives have been provided, discussed and informed consent has been obtained with: patient.  Pre-procedure education provided  Use of blood products discussed with patient  Consented to blood products.    Plan discussed with CRNA.    CODE STATUS:

## (undated) DEVICE — SUCTION CANISTER, 3000CC,SAFELINER: Brand: DEROYAL

## (undated) DEVICE — TRAP FLD MINIVAC MEGADYNE 100ML

## (undated) DEVICE — FRCP BX RADJAW4 NDL 2.8 240CM LG OG BX40

## (undated) DEVICE — Device

## (undated) DEVICE — JACKT LAB F/R KNIT CUFF/COLR XLG BLU

## (undated) DEVICE — SUT GUT CHRM 4/0 RB1 27IN U203H

## (undated) DEVICE — GLV SURG SENSICARE MICRO PF LF 7.5 STRL

## (undated) DEVICE — SPNG GZ WOVN 4X4IN 12PLY 10/BX STRL

## (undated) DEVICE — TBG PENCL TELESCP MEGADYNE SMOKE EVAC 10FT

## (undated) DEVICE — GOWN ISOL W/THUMB UNIV BLU BX/15

## (undated) DEVICE — GAUZE,SPONGE,FLUFF,6"X6.75",STRL,5/TRAY: Brand: MEDLINE

## (undated) DEVICE — BW-412T DISP COMBO CLEANING BRUSH: Brand: SINGLE USE COMBINATION CLEANING BRUSH

## (undated) DEVICE — ADAPT CLN BIOGUARD AIR/H2O DISP

## (undated) DEVICE — KT ORCA ORCAPOD DISP STRL

## (undated) DEVICE — LINER SURG CANSTR SXN S/RIGD 1500CC

## (undated) DEVICE — SYR LL W/SCALE/MARK 3ML STRL

## (undated) DEVICE — GLV SURG SENSICARE PI MIC PF SZ7.5 LF STRL

## (undated) DEVICE — ELECTRD NDL EZ CLN STD 2.75IN

## (undated) DEVICE — SOL IRR H2O BO 1000ML STRL

## (undated) DEVICE — PREMIUM WET SKIN PREP TRAY: Brand: MEDLINE INDUSTRIES, INC.

## (undated) DEVICE — SYR LL 3CC

## (undated) DEVICE — GLV SURG SENSICARE PI MIC PF SZ8 LF STRL

## (undated) DEVICE — VIAL FORMALIN CAP 10P 40ML

## (undated) DEVICE — SAFELINER SUCTION CANISTER 1000CC: Brand: DEROYAL

## (undated) DEVICE — Device: Brand: DEFENDO AIR/WATER/SUCTION AND BIOPSY VALVE

## (undated) DEVICE — SUT GUT CHRM 5/0 RB1 27IN U202H

## (undated) DEVICE — SOL IRR H2O BTL 1000ML STRL

## (undated) DEVICE — MASK,FACE,FLUID RESIST,SHLD,EARLOOP: Brand: MEDLINE